# Patient Record
Sex: FEMALE | Race: WHITE | Employment: UNEMPLOYED | URBAN - METROPOLITAN AREA
[De-identification: names, ages, dates, MRNs, and addresses within clinical notes are randomized per-mention and may not be internally consistent; named-entity substitution may affect disease eponyms.]

---

## 2017-10-10 DIAGNOSIS — F33.0 MAJOR DEPRESSIVE DISORDER, RECURRENT EPISODE, MILD (H): ICD-10-CM

## 2017-10-10 RX ORDER — TRAZODONE HYDROCHLORIDE 50 MG/1
TABLET, FILM COATED ORAL
Qty: 30 TABLET | Refills: 0 | Status: SHIPPED | OUTPATIENT
Start: 2017-10-10 | End: 2017-11-04

## 2017-11-04 DIAGNOSIS — F33.0 MAJOR DEPRESSIVE DISORDER, RECURRENT EPISODE, MILD (H): ICD-10-CM

## 2017-11-07 RX ORDER — TRAZODONE HYDROCHLORIDE 50 MG/1
TABLET, FILM COATED ORAL
Qty: 30 TABLET | Refills: 0 | Status: SHIPPED | OUTPATIENT
Start: 2017-11-07 | End: 2017-11-15

## 2017-11-07 NOTE — TELEPHONE ENCOUNTER
traZODone (DESYREL) 50 MG tablet    Routing refill request to provider for review/approval because:  Sharyn given x1 and patient did not follow up, please advise

## 2017-11-09 DIAGNOSIS — F33.0 MAJOR DEPRESSIVE DISORDER, RECURRENT EPISODE, MILD (H): ICD-10-CM

## 2017-11-10 RX ORDER — TRAZODONE HYDROCHLORIDE 50 MG/1
TABLET, FILM COATED ORAL
Qty: 30 TABLET | Refills: 0 | OUTPATIENT
Start: 2017-11-10

## 2017-11-15 ENCOUNTER — OFFICE VISIT (OUTPATIENT)
Dept: INTERNAL MEDICINE | Facility: CLINIC | Age: 57
End: 2017-11-15
Payer: COMMERCIAL

## 2017-11-15 VITALS
HEART RATE: 78 BPM | TEMPERATURE: 97.6 F | DIASTOLIC BLOOD PRESSURE: 78 MMHG | RESPIRATION RATE: 18 BRPM | SYSTOLIC BLOOD PRESSURE: 136 MMHG | WEIGHT: 213 LBS | BODY MASS INDEX: 36 KG/M2

## 2017-11-15 DIAGNOSIS — E78.5 HYPERLIPIDEMIA LDL GOAL <160: ICD-10-CM

## 2017-11-15 DIAGNOSIS — Z11.59 ENCOUNTER FOR HEPATITIS C SCREENING TEST FOR LOW RISK PATIENT: ICD-10-CM

## 2017-11-15 DIAGNOSIS — Z00.00 ENCOUNTER FOR ROUTINE ADULT HEALTH EXAMINATION WITHOUT ABNORMAL FINDINGS: Primary | ICD-10-CM

## 2017-11-15 DIAGNOSIS — F33.0 MAJOR DEPRESSIVE DISORDER, RECURRENT EPISODE, MILD (H): ICD-10-CM

## 2017-11-15 DIAGNOSIS — F32.0 MILD MAJOR DEPRESSION (H): ICD-10-CM

## 2017-11-15 DIAGNOSIS — Z12.31 ENCOUNTER FOR SCREENING MAMMOGRAM FOR BREAST CANCER: ICD-10-CM

## 2017-11-15 DIAGNOSIS — E66.3 OVERWEIGHT: ICD-10-CM

## 2017-11-15 PROCEDURE — 99396 PREV VISIT EST AGE 40-64: CPT | Performed by: INTERNAL MEDICINE

## 2017-11-15 RX ORDER — CITALOPRAM HYDROBROMIDE 20 MG/1
20 TABLET ORAL DAILY
Qty: 90 TABLET | Refills: 3 | Status: SHIPPED | OUTPATIENT
Start: 2017-11-15 | End: 2018-08-01

## 2017-11-15 RX ORDER — VENLAFAXINE 75 MG/1
75 TABLET ORAL DAILY
Qty: 90 TABLET | Refills: 3 | Status: SHIPPED | OUTPATIENT
Start: 2017-11-15 | End: 2019-04-10

## 2017-11-15 RX ORDER — TRAZODONE HYDROCHLORIDE 50 MG/1
50 TABLET, FILM COATED ORAL AT BEDTIME
Qty: 90 TABLET | Refills: 3 | Status: SHIPPED | OUTPATIENT
Start: 2017-11-15 | End: 2018-12-27

## 2017-11-15 ASSESSMENT — ANXIETY QUESTIONNAIRES
IF YOU CHECKED OFF ANY PROBLEMS ON THIS QUESTIONNAIRE, HOW DIFFICULT HAVE THESE PROBLEMS MADE IT FOR YOU TO DO YOUR WORK, TAKE CARE OF THINGS AT HOME, OR GET ALONG WITH OTHER PEOPLE: NOT DIFFICULT AT ALL
5. BEING SO RESTLESS THAT IT IS HARD TO SIT STILL: NOT AT ALL
7. FEELING AFRAID AS IF SOMETHING AWFUL MIGHT HAPPEN: NOT AT ALL
1. FEELING NERVOUS, ANXIOUS, OR ON EDGE: NEARLY EVERY DAY
3. WORRYING TOO MUCH ABOUT DIFFERENT THINGS: MORE THAN HALF THE DAYS
GAD7 TOTAL SCORE: 11
2. NOT BEING ABLE TO STOP OR CONTROL WORRYING: MORE THAN HALF THE DAYS
6. BECOMING EASILY ANNOYED OR IRRITABLE: SEVERAL DAYS

## 2017-11-15 ASSESSMENT — PATIENT HEALTH QUESTIONNAIRE - PHQ9
5. POOR APPETITE OR OVEREATING: NEARLY EVERY DAY
SUM OF ALL RESPONSES TO PHQ QUESTIONS 1-9: 3

## 2017-11-15 NOTE — NURSING NOTE
"Chief Complaint   Patient presents with     Physical     no pap       Initial BP (!) 146/94  Pulse 78  Temp 97.6  F (36.4  C) (Oral)  Resp 18  Wt 213 lb (96.6 kg)  BMI 36 kg/m2 Estimated body mass index is 36 kg/(m^2) as calculated from the following:    Height as of 10/6/16: 5' 4.5\" (1.638 m).    Weight as of this encounter: 213 lb (96.6 kg).  Blood pressure completed using cuff size: large    "

## 2017-11-15 NOTE — PROGRESS NOTES
Answers for HPI/ROS submitted by the patient on 11/12/2017   Annual Exam:  Getting at least 3 servings of Calcium per day:: Yes  Bi-annual eye exam:: Yes  Dental care twice a year:: Yes  Sleep apnea or symptoms of sleep apnea:: None  Frequency of exercise:: 4-5 days/week  Taking medications regularly:: Yes  Medication side effects:: None  Additional concerns today:: YES  PHQ-2 Score: 0  Duration of exercise:: 30-45 minutes     SUBJECTIVE:   CC: Katie Garcia is an 57 year old woman who presents for preventive health visit.           Today's PHQ-2 Score:   PHQ-2 ( 1999 [employer]) 11/15/2017 11/12/2017   Q1: Little interest or pleasure in doing things 0 0   Q2: Feeling down, depressed or hopeless - 0   PHQ-2 Score - 0   Q1: Little interest or pleasure in doing things - Not at all   Q2: Feeling down, depressed or hopeless - Not at all   PHQ-2 Score - 0       Abuse: Current or Past(Physical, Sexual or Emotional)- No  Do you feel safe in your environment - Yes    Social History   Substance Use Topics     Smoking status: Never Smoker     Smokeless tobacco: Never Used     Alcohol use No     none    Reviewed orders with patient.  Reviewed health maintenance and updated orders accordingly - Yes      Patient over age 50, mutual decision to screen reflected in health maintenance.      Pertinent mammograms are reviewed under the imaging tab.  History of abnormal Pap smear: NO - age 30-65 PAP every 5 years with negative HPV co-testing recommended    Reviewed and updated as needed this visit by clinical staffTobacco  Allergies  Meds  Problems  Med Hx  Surg Hx  Fam Hx  Soc Hx          Reviewed and updated as needed this visit by Provider  Allergies  Meds  Problems          Patient continues on both citalopram and venlafaxine for major depression.  Symptoms are well controlled, today's PHQ-9 noted.  Needs refills of medicines today, also takes trazodone to assist with sleep.    Has had difficulty losing weight.  She is  quite active, exercises regularly, but admittedly is not necessarily careful with her diet.  Likes to eat, feels she is an emotional eater.  Would be interested in nutrition referral if appropriate.    ROS:  C: NEGATIVE for fever, chills, change in weight  I: NEGATIVE for worrisome rashes, moles or lesions  E: NEGATIVE for vision changes or irritation  ENT: NEGATIVE for ear, mouth and throat problems  R: NEGATIVE for significant cough or SOB  B: NEGATIVE for masses, tenderness or discharge  CV: NEGATIVE for chest pain, palpitations or peripheral edema  GI: NEGATIVE for nausea, abdominal pain, heartburn, or change in bowel habits  : NEGATIVE for unusual urinary or vaginal symptoms. No vaginal bleeding.  M: NEGATIVE for significant arthralgias or myalgia  N: NEGATIVE for weakness, dizziness or paresthesias  P: NEGATIVE for changes in mood or affect     OBJECTIVE:   /78  Pulse 78  Temp 97.6  F (36.4  C) (Oral)  Resp 18  Wt 213 lb (96.6 kg)  BMI 36 kg/m2  EXAM:  GENERAL: healthy, alert and no distress  NECK: no adenopathy, no asymmetry, masses, or scars and thyroid normal to palpation  RESP: lungs clear to auscultation - no rales, rhonchi or wheezes  CV: regular rate and rhythm, normal S1 S2, no S3 or S4, no murmur, click or rub, no peripheral edema and peripheral pulses strong  ABDOMEN: soft, nontender, no hepatosplenomegaly, no masses and bowel sounds normal  MS: no gross musculoskeletal defects noted, no edema    ASSESSMENT/PLAN:   1. Encounter for routine adult health examination without abnormal findings  Discussed self breast exam, schedule for mammogram.  Discussed importance of regular pelvic exams and PAP smears to check for GYN malignancies.  Discussed cardiac risk factor modification including screening for (and treating if present) cholesterol, HTN, DM, and avoiding smoking.  Recommended a low fat diet and regular aerobic activity.     2. Major depressive disorder, recurrent episode, mild  "(H)  Well controlled, continue current medications.  - traZODone (DESYREL) 50 MG tablet; Take 1 tablet (50 mg) by mouth At Bedtime  Dispense: 90 tablet; Refill: 3  - citalopram (CELEXA) 20 MG tablet; Take 1 tablet (20 mg) by mouth daily  Dispense: 90 tablet; Refill: 3  - venlafaxine (EFFEXOR) 75 MG tablet; Take 1 tablet (75 mg) by mouth daily  Dispense: 90 tablet; Refill: 3  - DEPRESSION ACTION PLAN (DAP)    3. Encounter for hepatitis C screening test for low risk patient  Return for fasting surveillance labs in the next few weeks.  - Hepatitis C Screen Reflex to HCV RNA Quant and Genotype; Future    4. Mild major depression (H)      5. Hyperlipidemia LDL goal <160    - Lipid panel reflex to direct LDL Fasting; Future  - Comprehensive metabolic panel; Future    6. Encounter for screening mammogram for breast cancer    - *MA Screening Digital Bilateral; Future    7. Overweight  Nutrition referral, as ordered.  - NUTRITION REFERRAL    COUNSELING:   Reviewed preventive health counseling, as reflected in patient instructions         reports that she has never smoked. She has never used smokeless tobacco.    Estimated body mass index is 36 kg/(m^2) as calculated from the following:    Height as of 10/6/16: 5' 4.5\" (1.638 m).    Weight as of this encounter: 213 lb (96.6 kg).   Weight management plan nutritionist consultation    Counseling Resources:  ATP IV Guidelines  Pooled Cohorts Equation Calculator  Breast Cancer Risk Calculator  FRAX Risk Assessment  ICSI Preventive Guidelines  Dietary Guidelines for Americans, 2010  USDA's MyPlate  ASA Prophylaxis  Lung CA Screening    Enoc Huizar MD  [facility]  "

## 2017-11-15 NOTE — PATIENT INSTRUCTIONS
- Please come in for fasting labs in the next few weeks, at your convenience.  I will be in touch with you when I receive the results.    - Mammogram can be scheduled at our , or by calling our appointment line: [phone]

## 2017-11-15 NOTE — MR AVS SNAPSHOT
After Visit Summary   11/15/2017    Katie Garcia    MRN: [id number]           Patient Information     Date Of Birth          1960        Visit Information        Provider Department      11/15/2017 11:15 AM Enoc Huizar MD [facility]        Today's Diagnoses     Encounter for hepatitis C screening test for low risk patient    -  1    Major depressive disorder, recurrent episode, mild (H)        Mild major depression (H)        Hyperlipidemia LDL goal <160        Encounter for screening mammogram for breast cancer        Overweight          Care Instructions    - Please come in for fasting labs in the next few weeks, at your convenience.  I will be in touch with you when I receive the results.    - Mammogram can be scheduled at our , or by calling our appointment line: [phone]           Follow-ups after your visit        Additional Services     NUTRITION REFERRAL       Your provider has referred you to: [facility]: [facility] [phone]   [email or web address]    Please be aware that coverage of these services is subject to the terms and limitations of your health insurance plan.  Call member services at your health plan with any benefit or coverage questions.      Please bring the following with you to your appointment:    (1) This referral request  (2) Any documents given to you regarding this referral  (3) Any specific questions you have about diet and/or food choices                  Future tests that were ordered for you today     Open Future Orders        Priority Expected Expires Ordered    *MA Screening Digital Bilateral Routine  11/15/2018 11/15/2017    Lipid panel reflex to direct LDL Fasting Routine  2/13/2018 11/15/2017    Comprehensive metabolic panel Routine  2/13/2018 11/15/2017    Hepatitis C Screen Reflex to HCV RNA Quant and Genotype Routine  2/13/2018 11/15/2017             Who to contact     If you have questions or need follow up information about today's clinic visit or your schedule please contact [facility] directly at [phone].  Normal or non-critical lab and imaging results will be communicated to you by Leverhart, letter or phone within 4 business days after the clinic has received the results. If you do not hear from us within 7 days, please contact the clinic through Leverhart or phone. If you have a critical or abnormal lab result, we will notify you by phone as soon as possible.  Submit refill requests through [employer] or call your pharmacy and they will forward the refill request to us. Please allow 3 business days for your refill to be completed.          Additional Information About Your Visit        LeverharRetailTower Information     KartoonArt gives you secure access to your electronic health record. If you see a primary care provider, you can also send messages to your care team and make appointments. If you have questions, please call your primary care clinic.  If you do not have a primary care provider, please call [phone] and they will assist you.        Care EveryWhere ID     This is your Care EveryWhere ID. This could be used by other organizations to access your [facility] medical records  [id number]        Your Vitals Were     Pulse Temperature Respirations BMI (Body Mass Index)          78 97.6  F (36.4  C) (Oral) 18 36 kg/m2         Blood Pressure from Last 3 Encounters:   11/15/17 (!) 146/94   10/06/16 126/80   09/03/15 122/72    Weight from Last 3 Encounters:   11/15/17 213 lb (96.6 kg)   10/06/16 218 lb 9 oz (99.1 kg)   09/03/15 216 lb (98 kg)              We Performed the Following     DEPRESSION ACTION PLAN (DAP)     NUTRITION REFERRAL          Today's Medication Changes          These changes are accurate as of: 11/15/17 12:14 PM.  If you have any questions, ask your nurse or doctor.               These medicines have changed or  have updated prescriptions.        Dose/Directions    traZODone 50 MG tablet   Commonly known as:  DESYREL   This may have changed:  See the new instructions.   Used for:  Major depressive disorder, recurrent episode, mild (H)   Changed by:  Enoc Huizar MD        Dose:  50 mg   Take 1 tablet (50 mg) by mouth At Bedtime   Quantity:  90 tablet   Refills:  3            Where to get your medicines      These medications were sent to [email or web address] [address] [place] [address] [address] AT [place] [address] [address], [facility] [address]    Hours:  24-hours Phone:  [phone]     citalopram 20 MG tablet    traZODone 50 MG tablet    venlafaxine 75 MG tablet                Primary Care Provider Office Phone # Fax #    Enoc Huizar [phone]       [address] [place] [address]        Equal Access to Services     [facility]IE [facility] Center[facility] AH: Hadii aad ku hadasho Soomaali, waaxda luqadaha, qaybta kaalmada adeegyada, waxay idiin hayaan adeeg kharasusy juárez . So [facility] [phone].    ATENCIÓN: Si habla español, tiene a broussard disposición servicios gratuitos de asistencia lingüística. LlPaulding [facility] Hospital [phone].    We comply with applicable federal civil rights laws and [place] laws. We do not discriminate on the basis of race, color, national origin, age, disability, sex, sexual orientation, or gender identity.            Thank you!     Thank you for choosing [facility]  for your care. Our goal is always to provide you with excellent care. Hearing back from our patients is one way we can continue to improve our services. Please take a few minutes to complete the written survey that you may receive in the mail after your visit with us. Thank you!             Your Updated Medication List - Protect others around you: Learn how to safely use, store and throw away your medicines at www.disposemymeds.org.          This list is accurate as of: 11/15/17  12:14 PM.  Always use your most recent med list.                   Brand Name Dispense Instructions for use Diagnosis    citalopram 20 MG tablet    celeXA    90 tablet    Take 1 tablet (20 mg) by mouth daily    Major depressive disorder, recurrent episode, mild (H)       traZODone 50 MG tablet    DESYREL    90 tablet    Take 1 tablet (50 mg) by mouth At Bedtime    Major depressive disorder, recurrent episode, mild (H)       venlafaxine 75 MG tablet    EFFEXOR    90 tablet    Take 1 tablet (75 mg) by mouth daily    Major depressive disorder, recurrent episode, mild (H)

## 2017-11-16 ASSESSMENT — ANXIETY QUESTIONNAIRES: GAD7 TOTAL SCORE: 11

## 2017-11-17 DIAGNOSIS — Z11.59 ENCOUNTER FOR HEPATITIS C SCREENING TEST FOR LOW RISK PATIENT: ICD-10-CM

## 2017-11-17 DIAGNOSIS — E78.5 HYPERLIPIDEMIA LDL GOAL <160: ICD-10-CM

## 2017-11-17 LAB
ALBUMIN SERPL-MCNC: 3.7 G/DL (ref 3.4–5)
ALP SERPL-CCNC: 84 U/L (ref 40–150)
ALT SERPL W P-5'-P-CCNC: 24 U/L (ref 0–50)
ANION GAP SERPL CALCULATED.3IONS-SCNC: 5 MMOL/L (ref 3–14)
AST SERPL W P-5'-P-CCNC: 14 U/L (ref 0–45)
BILIRUB SERPL-MCNC: 0.5 MG/DL (ref 0.2–1.3)
BUN SERPL-MCNC: 16 MG/DL (ref 7–30)
CALCIUM SERPL-MCNC: 8.8 MG/DL (ref 8.5–10.1)
CHLORIDE SERPL-SCNC: 99 MMOL/L (ref 94–109)
CHOLEST SERPL-MCNC: 264 MG/DL
CO2 SERPL-SCNC: 32 MMOL/L (ref 20–32)
CREAT SERPL-MCNC: 0.97 MG/DL (ref 0.52–1.04)
GFR SERPL CREATININE-BSD FRML MDRD: 59 ML/MIN/1.7M2
GLUCOSE SERPL-MCNC: 87 MG/DL (ref 70–99)
HCV AB SERPL QL IA: NONREACTIVE
HDLC SERPL-MCNC: 69 MG/DL
LDLC SERPL CALC-MCNC: 170 MG/DL
NONHDLC SERPL-MCNC: 195 MG/DL
POTASSIUM SERPL-SCNC: 4 MMOL/L (ref 3.4–5.3)
PROT SERPL-MCNC: 7.4 G/DL (ref 6.8–8.8)
SODIUM SERPL-SCNC: 136 MMOL/L (ref 133–144)
TRIGL SERPL-MCNC: 127 MG/DL

## 2017-11-17 PROCEDURE — 80061 LIPID PANEL: CPT | Performed by: INTERNAL MEDICINE

## 2017-11-17 PROCEDURE — 80053 COMPREHEN METABOLIC PANEL: CPT | Performed by: INTERNAL MEDICINE

## 2017-11-17 PROCEDURE — 86803 HEPATITIS C AB TEST: CPT | Performed by: INTERNAL MEDICINE

## 2017-11-17 PROCEDURE — 36415 COLL VENOUS BLD VENIPUNCTURE: CPT | Performed by: INTERNAL MEDICINE

## 2017-12-11 DIAGNOSIS — F33.0 MAJOR DEPRESSIVE DISORDER, RECURRENT EPISODE, MILD (H): ICD-10-CM

## 2017-12-12 RX ORDER — VENLAFAXINE 75 MG/1
TABLET ORAL
Qty: 180 TABLET | Refills: 0 | Status: SHIPPED | OUTPATIENT
Start: 2017-12-12 | End: 2018-08-01

## 2017-12-12 NOTE — TELEPHONE ENCOUNTER
Dosing sent to pharmacy for 1 tablet daily at last office visit.  Medication request is for BID dosing.  Was dose changed?

## 2018-06-07 DIAGNOSIS — F33.0 MAJOR DEPRESSIVE DISORDER, RECURRENT EPISODE, MILD (H): ICD-10-CM

## 2018-06-07 RX ORDER — VENLAFAXINE 75 MG/1
TABLET ORAL
Qty: 60 TABLET | Refills: 0 | Status: SHIPPED | OUTPATIENT
Start: 2018-06-07 | End: 2018-08-01

## 2018-06-07 NOTE — TELEPHONE ENCOUNTER
"Requested Prescriptions   Pending Prescriptions Disp Refills     venlafaxine (EFFEXOR) 75 MG tablet [Pharmacy Med Name: VENLAFAXINE 75MG TABLETS] 180 tablet 0     Sig: TAKE 1 TABLET BY MOUTH TWICE DAILY    Serotonin-Norepinephrine Reuptake Inhibitors  Failed    6/7/2018 12:24 PM       Failed - PHQ-9 score of less than 5 in past 6 months    Please review last PHQ-9 score.          Failed - Recent (6 mo) or future (30 days) visit within the authorizing provider's specialty    Patient had office visit in the last 6 months or has a visit in the next 30 days with authorizing provider or within the authorizing provider's specialty.  See \"Patient Info\" tab in inbasket, or \"Choose Columns\" in Meds & Orders section of the refill encounter.           Passed - Blood pressure under 140/90 in past 12 months    BP Readings from Last 3 Encounters:   11/15/17 136/78   10/06/16 126/80   09/03/15 122/72                Passed - Patient is age 18 or older       Passed - No active pregnancy on record       Passed - Normal serum creatinine on file in past 12 months    Recent Labs   Lab Test  11/17/17   1004   CR  0.97            Passed - No positive pregnancy test in past 12 months        PHQ-9 SCORE 4/17/2015 9/3/2015 11/15/2017   Total Score 2 - -   Total Score - 2 3     Medication is being filled for 1 time refill only due to:  due for office visit    "

## 2018-08-01 ENCOUNTER — OFFICE VISIT (OUTPATIENT)
Dept: INTERNAL MEDICINE | Facility: CLINIC | Age: 58
End: 2018-08-01
Payer: COMMERCIAL

## 2018-08-01 VITALS
TEMPERATURE: 97.6 F | WEIGHT: 208 LBS | HEIGHT: 65 IN | SYSTOLIC BLOOD PRESSURE: 110 MMHG | HEART RATE: 80 BPM | RESPIRATION RATE: 16 BRPM | DIASTOLIC BLOOD PRESSURE: 72 MMHG | BODY MASS INDEX: 34.66 KG/M2

## 2018-08-01 DIAGNOSIS — Z12.31 VISIT FOR SCREENING MAMMOGRAM: ICD-10-CM

## 2018-08-01 DIAGNOSIS — E66.01 MORBID OBESITY (H): ICD-10-CM

## 2018-08-01 DIAGNOSIS — F32.0 MILD MAJOR DEPRESSION (H): Primary | ICD-10-CM

## 2018-08-01 PROCEDURE — 99214 OFFICE O/P EST MOD 30 MIN: CPT | Performed by: INTERNAL MEDICINE

## 2018-08-01 RX ORDER — VENLAFAXINE HYDROCHLORIDE 37.5 MG/1
CAPSULE, EXTENDED RELEASE ORAL
Qty: 26 CAPSULE | Refills: 0 | Status: SHIPPED | OUTPATIENT
Start: 2018-08-01 | End: 2019-04-10

## 2018-08-01 RX ORDER — CITALOPRAM HYDROBROMIDE 10 MG/1
TABLET ORAL
Qty: 26 TABLET | Refills: 0 | Status: SHIPPED | OUTPATIENT
Start: 2018-08-01 | End: 2019-04-10

## 2018-08-01 NOTE — MR AVS SNAPSHOT
After Visit Summary   8/1/2018    Katie Garcia    MRN: [id number]           Patient Information     Date Of Birth          1960        Visit Information        Provider Department      8/1/2018 1:30 PM Enoc Huizar MD [facility]        Today's Diagnoses     Mild major depression (H)    -  1    Visit for screening mammogram        Morbid obesity (H)           Follow-ups after your visit        Future tests that were ordered for you today     Open Future Orders        Priority Expected Expires Ordered    MA SCREENING DIGITAL BILAT - Future  (s+30) Routine  8/1/2019 8/1/2018            Who to contact     If you have questions or need follow up information about today's clinic visit or your schedule please contact [facility] directly at [phone].  Normal or non-critical lab and imaging results will be communicated to you by Playnomicshart, letter or phone within 4 business days after the clinic has received the results. If you do not hear from us within 7 days, please contact the clinic through Playnomicshart or phone. If you have a critical or abnormal lab result, we will notify you by phone as soon as possible.  Submit refill requests through [employer] or call your pharmacy and they will forward the refill request to us. Please allow 3 business days for your refill to be completed.          Additional Information About Your Visit        MyChart Information     [employer] gives you secure access to your electronic health record. If you see a primary care provider, you can also send messages to your care team and make appointments. If you have questions, please call your primary care clinic.  If you do not have a primary care provider, please call [phone] and they will assist you.        Care EveryWhere ID     This is your Care EveryWhere ID. This could be used by other organizations to access your [facility] medical records  [id number]       "  Your Vitals Were     Pulse Temperature Respirations Height BMI (Body Mass Index)       80 97.6  F (36.4  C) (Oral) 16 5' 4.5\" (1.638 m) 35.15 kg/m2        Blood Pressure from Last 3 Encounters:   08/01/18 110/72   11/15/17 136/78   10/06/16 126/80    Weight from Last 3 Encounters:   08/01/18 208 lb (94.3 kg)   11/15/17 213 lb (96.6 kg)   10/06/16 218 lb 9 oz (99.1 kg)                 Today's Medication Changes          These changes are accurate as of 8/1/18  2:02 PM.  If you have any questions, ask your nurse or doctor.               These medicines have changed or have updated prescriptions.        Dose/Directions    citalopram 10 MG tablet   Commonly known as:  celeXA   This may have changed:    - medication strength  - how much to take  - how to take this  - when to take this  - additional instructions   Used for:  Mild major depression (H)   Changed by:  Enoc Huizar MD        1 tablet daily x 2 weeks, then 1 tablet every other day x 2 weeks, then 1 tablet every 3rd day x 2 weeks, then stop completely   Quantity:  26 tablet   Refills:  0       * venlafaxine 75 MG tablet   Commonly known as:  EFFEXOR   This may have changed:  Another medication with the same name was added. Make sure you understand how and when to take each.   Used for:  Major depressive disorder, recurrent episode, mild (H)   Changed by:  Enoc Huizar MD        Dose:  75 mg   Take 1 tablet (75 mg) by mouth daily   Quantity:  90 tablet   Refills:  3       * venlafaxine 37.5 MG 24 hr capsule   Commonly known as:  EFFEXOR-XR   This may have changed:  You were already taking a medication with the same name, and this prescription was added. Make sure you understand how and when to take each.   Used for:  Mild major depression (H)   Changed by:  Enoc Huizar MD        Take 1 tab daily x 2 weeks, then 1 tab every other day x 2 weeks, then 1 tab every 3rd day x 2 weeks, then stop.  Begin this 2 weeks after tapering off " citalopram.   Quantity:  26 capsule   Refills:  0       * Notice:  This list has 2 medication(s) that are the same as other medications prescribed for you. Read the directions carefully, and ask your doctor or other care provider to review them with you.         Where to get your medicines      These medications were sent to [facility] [id number] - [facility] [address] AT [facility] [address], [facility] [address]     Phone:  [phone]     citalopram 10 MG tablet         Some of these will need a paper prescription and others can be bought over the counter.  Ask your nurse if you have questions.     Bring a paper prescription for each of these medications     venlafaxine 37.5 MG 24 hr capsule                Primary Care Provider Office Phone # Fax #    Enoc Huizar -[phone]       [address] [place] [address]        Equal Access to Services     REJI CHRISTOPHER AH: Hadii aad ku hadasho Soomaali, waaxda luqadaha, qaybta kaalmada adeegyada, waxay idiin hayagustinn adiel juárez . So [facility] [phone].    ATENCIÓN: Si jerela español, tiene a broussard disposición servicios gratuitos de asistencia lingüística. Llame al [phone].    We comply with applicable federal civil rights laws and [place] laws. We do not discriminate on the basis of race, color, national origin, age, disability, sex, sexual orientation, or gender identity.            Thank you!     Thank you for choosing [facility]  for your care. Our goal is always to provide you with excellent care. Hearing back from our patients is one way we can continue to improve our services. Please take a few minutes to complete the written survey that you may receive in the mail after your visit with us. Thank you!             Your Updated Medication List - Protect others around you: Learn how to safely use, store and throw away your medicines at www.disposemymeds.org.           This list is accurate as of 8/1/18  2:02 PM.  Always use your most recent med list.                   Brand Name Dispense Instructions for use Diagnosis    citalopram 10 MG tablet    celeXA    26 tablet    1 tablet daily x 2 weeks, then 1 tablet every other day x 2 weeks, then 1 tablet every 3rd day x 2 weeks, then stop completely    Mild major depression (H)       traZODone 50 MG tablet    DESYREL    90 tablet    Take 1 tablet (50 mg) by mouth At Bedtime    Major depressive disorder, recurrent episode, mild (H)       * venlafaxine 75 MG tablet    EFFEXOR    90 tablet    Take 1 tablet (75 mg) by mouth daily    Major depressive disorder, recurrent episode, mild (H)       * venlafaxine 37.5 MG 24 hr capsule    EFFEXOR-XR    26 capsule    Take 1 tab daily x 2 weeks, then 1 tab every other day x 2 weeks, then 1 tab every 3rd day x 2 weeks, then stop.  Begin this 2 weeks after tapering off citalopram.    Mild major depression (H)       * Notice:  This list has 2 medication(s) that are the same as other medications prescribed for you. Read the directions carefully, and ask your doctor or other care provider to review them with you.

## 2018-08-01 NOTE — PROGRESS NOTES
"  SUBJECTIVE:   Katie Garcia is a 58 year old female who presents to clinic today for the following health issues:      Depression Followup    Status since last visit: Stable     See PHQ-9 for current symptoms.  Other associated symptoms: None    Complicating factors:   Significant life event:  No   Current substance abuse:  None  Anxiety or Panic symptoms:  No    PHQ-9 9/3/2015 11/15/2017   Total Score 2 3   Q9: Suicide Ideation Not at all Not at all       PHQ-9  English  PHQ-9   Any Language  Suicide Assessment Five-step Evaluation and Treatment (SAFE-T)      Amount of exercise or physical activity: 4-5 days/week for an average of 30-60 minutes    Problems taking medications regularly: No    Medication side effects: none    Diet: regular (no restrictions)            Problem list and histories reviewed & adjusted, as indicated.  Additional history:     Patient has been on current doses of antidepressant medications for a number of years now, would like to try coming off of them completely.  At present she is on citalopram 20 mg daily, Effexor XR 75 mg daily.  Today's PHQ 9 noted.    Due for mammogram and a number of other health maintenance tasks, she agrees to proceed with mammogram scheduling.    Reviewed and updated as needed this visit by clinical staff       Reviewed and updated as needed this visit by Provider         ROS:  Constitutional, HEENT, cardiovascular, pulmonary, GI, , musculoskeletal, neuro, skin, endocrine and psych systems are negative, except as otherwise noted.    OBJECTIVE:     /72  Pulse 80  Temp 97.6  F (36.4  C) (Oral)  Resp 16  Ht 5' 4.5\" (1.638 m)  Wt 208 lb (94.3 kg)  BMI 35.15 kg/m2  Body mass index is 35.15 kg/(m^2).  GENERAL: healthy, alert and no distress  NECK: no adenopathy, no asymmetry, masses, or scars and thyroid normal to palpation  RESP: lungs clear to auscultation - no rales, rhonchi or wheezes  CV: regular rate and rhythm, normal S1 S2, no S3 or S4, no " murmur, click or rub, no peripheral edema and peripheral pulses strong  ABDOMEN: soft, nontender, no hepatosplenomegaly, no masses and bowel sounds normal  MS: no gross musculoskeletal defects noted, no edema        ASSESSMENT/PLAN:     1. Mild major depression (H)  We will taper off both medications slowly, one at a time.  Will start with tapering citalopram over the next 6 weeks, as designated by new prescription.  During that time she will continue her venlafaxine as currently.  After citalopram stopped completely, continue venlafaxine monotherapy at current dose for 2 more weeks, then start venlafaxine taper as designated below.  - citalopram (CELEXA) 10 MG tablet; 1 tablet daily x 2 weeks, then 1 tablet every other day x 2 weeks, then 1 tablet every 3rd day x 2 weeks, then stop completely  Dispense: 26 tablet; Refill: 0  - venlafaxine (EFFEXOR-XR) 37.5 MG 24 hr capsule; Take 1 tab daily x 2 weeks, then 1 tab every other day x 2 weeks, then 1 tab every 3rd day x 2 weeks, then stop.  Begin this 2 weeks after tapering off citalopram.  Dispense: 26 capsule; Refill: 0    2. Visit for screening mammogram    - MA SCREENING DIGITAL BILAT - Future  (s+30); Future    3. Morbid obesity (H)  Reiterated importance of healthy diet, exercise, etc.          Enoc Huizar MD  [facility]

## 2018-08-02 ASSESSMENT — PATIENT HEALTH QUESTIONNAIRE - PHQ9: SUM OF ALL RESPONSES TO PHQ QUESTIONS 1-9: 1

## 2018-11-10 ENCOUNTER — HEALTH MAINTENANCE LETTER (OUTPATIENT)
Age: 58
End: 2018-11-10

## 2018-12-27 DIAGNOSIS — F33.0 MAJOR DEPRESSIVE DISORDER, RECURRENT EPISODE, MILD (H): ICD-10-CM

## 2018-12-28 NOTE — TELEPHONE ENCOUNTER
"Requested Prescriptions   Pending Prescriptions Disp Refills     traZODone (DESYREL) 50 MG tablet [Pharmacy Med Name: TRAZODONE 50MG TABLETS] 90 tablet 0    Last Written Prescription Date:  11/15/2017  Last Fill Quantity: 90,  # refills: 3   Last office visit: 8/1/2018 with prescribing provider:  8/1/2018   Future Office Visit:     Sig: TAKE 1 TABLET BY MOUTH AT BEDTIME    Serotonin Modulators Passed - 12/27/2018  3:43 PM       Passed - Recent (12 mo) or future (30 days) visit within the authorizing provider's specialty    Patient had office visit in the last 12 months or has a visit in the next 30 days with authorizing provider or within the authorizing provider's specialty.  See \"Patient Info\" tab in inbasket, or \"Choose Columns\" in Meds & Orders section of the refill encounter.             Passed - Patient is age 18 or older       Passed - No active pregnancy on record       Passed - No positive pregnancy test in past 12 months          "

## 2019-01-03 RX ORDER — TRAZODONE HYDROCHLORIDE 50 MG/1
TABLET, FILM COATED ORAL
Qty: 90 TABLET | Refills: 0 | Status: SHIPPED | OUTPATIENT
Start: 2019-01-03 | End: 2019-01-08

## 2019-01-08 DIAGNOSIS — F33.0 MAJOR DEPRESSIVE DISORDER, RECURRENT EPISODE, MILD (H): ICD-10-CM

## 2019-01-08 RX ORDER — TRAZODONE HYDROCHLORIDE 50 MG/1
50 TABLET, FILM COATED ORAL AT BEDTIME
Qty: 90 TABLET | Refills: 1 | Status: SHIPPED | OUTPATIENT
Start: 2019-01-08 | End: 2019-07-05

## 2019-01-08 NOTE — TELEPHONE ENCOUNTER
"Requested Prescriptions   Pending Prescriptions Disp Refills     traZODone (DESYREL) 50 MG tablet 90 tablet 0     Sig: Take 1 tablet (50 mg) by mouth At Bedtime    Serotonin Modulators Passed - 1/8/2019 10:37 AM       Passed - Recent (12 mo) or future (30 days) visit within the authorizing provider's specialty    Patient had office visit in the last 12 months or has a visit in the next 30 days with authorizing provider or within the authorizing provider's specialty.  See \"Patient Info\" tab in inbasket, or \"Choose Columns\" in Meds & Orders section of the refill encounter.             Passed - Medication is active on med list       Passed - Patient is age 18 or older       Passed - No active pregnancy on record       Passed - No positive pregnancy test in past 12 months          "

## 2019-01-08 NOTE — TELEPHONE ENCOUNTER
Medication has already been approved but patients insurance changed and she needs refill sent to a different pharmacy.

## 2019-01-22 ENCOUNTER — TELEPHONE (OUTPATIENT)
Dept: INTERNAL MEDICINE | Facility: CLINIC | Age: 59
End: 2019-01-22

## 2019-01-22 NOTE — TELEPHONE ENCOUNTER
Panel Management Review  [facility]    Patient Active Problem List   Diagnosis     Mild major depression (H)     Hyperlipidemia     HYPERLIPIDEMIA LDL GOAL <160     Other disorder of menstruation and other abnormal bleeding from female genital tract     Morbid obesity (H)         Patient is due/failing the following:   PREVENTION AND SCREENING       PAP       MAMMOGRAM  CARDIOVASCULAR and RENAL  DIABETES  DEPRESSION  ASTHMA  MIGRAINE  COPD  HEART FAILURE  CKD    Action needed:   Please call to schedule a Mammogram and Pap.     Type of outreach:    Sent letter.    Rosa Cedillo

## 2019-01-22 NOTE — LETTER
[facility]  [address] [place] [address]  [phone]  January 22, 2019    Katie Garcia  [address]  [place] [place] [address]    Dear Katie,    We care about your health and based on a review of your medical records, recommend the the following, to better manage your health:      You are in particular need of attention regarding:  -Breast Cancer Screening  -Cervical Cancer Screening    I am recommending that you:     -schedule a MAMMOGRAM which is due.    1 in 8 women will develop invasive breast cancer during her lifetime and it is the most common non-skin cancer in American women.  EARLY detection, new treatments, and a better understanding of the disease have increased survival rates - the 5 year survival rate in the 1960s was 63% and today it is close to 90%.    If you are under/uninsured, we recommend you contact the [facility]. They offer mammograms at no charge or on a sliding fee charge. You can schedule with them at [phone]. Please have them send us the results.      Please disregard this reminder if you have had this exam elsewhere within the last year.  It would be helpful for us to have a copy of your mammogram report in your file so that we can best coordinate your care - please contact us with when your test was done so we can update your record.             -schedule a PAP SMEAR EXAM which is due.  Please disregard this reminder if you have had this exam elsewhere within the last year.  It would be helpful for us to have a copy of your recent pap smear report in our file so that we can best coordinate your care.    If you are under/uninsured, we recommend you contact the [facility]. They offer pap smears at no charge or on a sliding fee charge. You can schedule with them at [phone]. Please have them send us the results.      Here is a list of Health Maintenance topics that are due now or due  soon:  Health Maintenance Due   Topic Date Due     HIV SCREEN (SYSTEM ASSIGNED)  05/11/1978     Zoster (Chicken Pox) Vaccine (1 of 2) 05/11/2010     Discuss Advance Directive Planning  05/11/2015     Mammogram - every 2 years  09/09/2017     Diptheria Tetanus Pertussis (DTAP/TDAP/TD) Vaccine (2 - Td) 03/05/2018     Flu Vaccine (1) 09/01/2018     Pap Smear - every 3 years  09/03/2018     Depression Assessment - every 6 months  02/01/2019       Please call us at [phone] or [phone] (or use Anaplan) to address the above recommendations.     Thank you for trusting [facility].  We appreciate the opportunity to serve you and look forward to supporting your healthcare needs in the future.    If you have (or plan to have) any of these tests done at a facility other than a [facility] or a [facility], please have the results from these tests sent to your primary physician at [facility].    Healthy Regards,    Woodrow Huizar MD/[facility]

## 2019-02-05 ENCOUNTER — TELEPHONE (OUTPATIENT)
Dept: INTERNAL MEDICINE | Facility: CLINIC | Age: 59
End: 2019-02-05

## 2019-02-05 NOTE — TELEPHONE ENCOUNTER
Panel Management Review  [facility]    Patient Active Problem List   Diagnosis     Mild major depression (H)     Hyperlipidemia     HYPERLIPIDEMIA LDL GOAL <160     Other disorder of menstruation and other abnormal bleeding from female genital tract     Morbid obesity (H)         Patient is due/failing the following:   PREVENTION AND SCREENING       PAP  CARDIOVASCULAR and RENAL  DIABETES  DEPRESSION  ASTHMA  MIGRAINE  COPD  HEART FAILURE  CKD    Action needed:   Patient needs office visit with MD (no labs needed)    Type of outreach:    Sent letter.    Rosa Cedillo

## 2019-02-05 NOTE — LETTER
[facility]  [address], [place] [address] [phone]  February 5, 2019    Katie Garcia  [address]  [facility] [address]    Dear Katie,    We care about your health and based on a review of your medical records, recommend the the following, to better manage your health:      You are in particular need of attention regarding:  -Cervical Cancer Screening    I am recommending that you:     -schedule a PAP SMEAR EXAM which is due.  Please disregard this reminder if you have had this exam elsewhere within the last year.  It would be helpful for us to have a copy of your recent pap smear report in our file so that we can best coordinate your care.    If you are under/uninsured, we recommend you contact the [facility]. They offer pap smears at no charge or on a sliding fee charge. You can schedule with them at [phone]. Please have them send us the results.      Here is a list of Health Maintenance topics that are due now or due soon:  Health Maintenance Due   Topic Date Due     HIV SCREEN (SYSTEM ASSIGNED)  05/11/1978     Zoster (Shingles) Vaccine (1 of 2) 05/11/2010     Discuss Advance Directive Planning  05/11/2015     Mammogram - every 2 years  09/09/2017     Diptheria Tetanus Pertussis (DTAP/TDAP/TD) Vaccine (2 - Td) 03/05/2018     Flu Vaccine (1) 09/01/2018     Pap Smear - every 3 years  09/03/2018     Depression Assessment - every 6 months  02/01/2019       Please call us at [phone] or [phone] (or use Pivot) to address the above recommendations.     Thank you for trusting [facility].  We appreciate the opportunity to serve you and look forward to supporting your healthcare needs in the future.    If you have (or plan to have) any of these tests done at a facility other than a [facility] or a [facility], please have the results from these tests sent to your primary physician at [facility].    Healthy Regards,    Woodrow Huizar MD/[facility]illo, CMA

## 2019-03-13 ENCOUNTER — ANCILLARY PROCEDURE (OUTPATIENT)
Dept: MAMMOGRAPHY | Facility: CLINIC | Age: 59
End: 2019-03-13
Attending: INTERNAL MEDICINE
Payer: COMMERCIAL

## 2019-03-13 ENCOUNTER — OFFICE VISIT (OUTPATIENT)
Dept: INTERNAL MEDICINE | Facility: CLINIC | Age: 59
End: 2019-03-13
Payer: COMMERCIAL

## 2019-03-13 ENCOUNTER — TELEPHONE (OUTPATIENT)
Dept: INTERNAL MEDICINE | Facility: CLINIC | Age: 59
End: 2019-03-13

## 2019-03-13 VITALS
RESPIRATION RATE: 16 BRPM | BODY MASS INDEX: 34.29 KG/M2 | SYSTOLIC BLOOD PRESSURE: 140 MMHG | HEIGHT: 65 IN | WEIGHT: 205.8 LBS | OXYGEN SATURATION: 98 % | DIASTOLIC BLOOD PRESSURE: 80 MMHG | HEART RATE: 70 BPM | TEMPERATURE: 97.9 F

## 2019-03-13 DIAGNOSIS — Z12.11 SPECIAL SCREENING FOR MALIGNANT NEOPLASMS, COLON: ICD-10-CM

## 2019-03-13 DIAGNOSIS — I10 ESSENTIAL HYPERTENSION: Primary | ICD-10-CM

## 2019-03-13 DIAGNOSIS — Z23 NEED FOR DIPHTHERIA-TETANUS-PERTUSSIS (TDAP) VACCINE: ICD-10-CM

## 2019-03-13 DIAGNOSIS — Z12.31 VISIT FOR SCREENING MAMMOGRAM: ICD-10-CM

## 2019-03-13 PROCEDURE — 90471 IMMUNIZATION ADMIN: CPT | Performed by: INTERNAL MEDICINE

## 2019-03-13 PROCEDURE — 77067 SCR MAMMO BI INCL CAD: CPT | Mod: TC

## 2019-03-13 PROCEDURE — 99214 OFFICE O/P EST MOD 30 MIN: CPT | Mod: 25 | Performed by: INTERNAL MEDICINE

## 2019-03-13 PROCEDURE — 90715 TDAP VACCINE 7 YRS/> IM: CPT | Performed by: INTERNAL MEDICINE

## 2019-03-13 RX ORDER — LISINOPRIL 10 MG/1
10 TABLET ORAL DAILY
Qty: 30 TABLET | Refills: 3 | Status: SHIPPED | OUTPATIENT
Start: 2019-03-13 | End: 2019-04-10

## 2019-03-13 ASSESSMENT — MIFFLIN-ST. JEOR: SCORE: 1506.44

## 2019-03-13 NOTE — PROGRESS NOTES
"  SUBJECTIVE:   Katie Garcia is a 58 year old female who presents to clinic today for the following health issues:      Medication Followup of Trazodone    Taking Medication as prescribed: yes    Side Effects:  None    Medication Helping Symptoms:  NO           Problem list and histories reviewed & adjusted, as indicated.  Additional history:     Has had elevated blood pressures in recent weeks to as high as 170s while at the dentist recently.  No previous personal treatment for high blood pressure, does have family history of hypertension.    Reviewed and updated as needed this visit by clinical staff  Tobacco  Allergies  Meds  Problems  Med Hx  Surg Hx  Fam Hx       Reviewed and updated as needed this visit by Provider  Tobacco  Allergies  Meds  Problems  Med Hx  Surg Hx  Fam Hx         ROS:  Constitutional, HEENT, cardiovascular, pulmonary, GI, , musculoskeletal, neuro, skin, endocrine and psych systems are negative, except as otherwise noted.    OBJECTIVE:     /80 (BP Location: Left arm, Patient Position: Chair, Cuff Size: Adult Large)   Pulse 70   Temp 97.9  F (36.6  C) (Oral)   Resp 16   Ht 1.638 m (5' 4.5\")   Wt 93.4 kg (205 lb 12.8 oz)   SpO2 98%   BMI 34.78 kg/m    Body mass index is 34.78 kg/m .  GENERAL: healthy, alert and no distress  NECK: no adenopathy, no asymmetry, masses, or scars and thyroid normal to palpation  RESP: lungs clear to auscultation - no rales, rhonchi or wheezes  CV: regular rate and rhythm, normal S1 S2, no S3 or S4, no murmur, click or rub, no peripheral edema and peripheral pulses strong  ABDOMEN: soft, nontender, no hepatosplenomegaly, no masses and bowel sounds normal  MS: no gross musculoskeletal defects noted, no edema        ASSESSMENT/PLAN:     1. Essential hypertension  Discussed options, will start low-dose lisinopril.  Follow-up in 1 month with previsit surveillance labs.  - lisinopril (PRINIVIL/ZESTRIL) 10 MG tablet; Take 1 tablet (10 " mg) by mouth daily  Dispense: 30 tablet; Refill: 3  - Lipid panel reflex to direct LDL Fasting; Future  - Comprehensive metabolic panel; Future  - UA with Microscopic reflex to Culture; Future  - TSH with free T4 reflex; Future    2. Need for diphtheria-tetanus-pertussis (Tdap) vaccine    - VACCINE ADMINISTRATION, INITIAL    3. Special screening for malignant neoplasms, colon    - GASTROENTEROLOGY ADULT REF PROCEDURE ONLY Matias Chaudhry [phone] ([place] [facility])        Enoc Huizar MD  [facility]

## 2019-03-13 NOTE — NURSING NOTE
Screening Questionnaire for Adult Immunization    Are you sick today?   No   Do you have allergies to medications, food, a vaccine component or latex?   No   Have you ever had a serious reaction after receiving a vaccination?   No   Do you have a long-term health problem with heart disease, lung disease, asthma, kidney disease, metabolic disease (e.g. diabetes), anemia, or other blood disorder?   No   Do you have cancer, leukemia, HIV/AIDS, or any other immune system problem?   No   In the past 3 months, have you taken medications that affect  your immune system, such as prednisone, other steroids, or anticancer drugs; drugs for the treatment of rheumatoid arthritis, Crohn s disease, or psoriasis; or have you had radiation treatments?   No   Have you had a seizure, or a brain or other nervous system problem?   No   During the past year, have you received a transfusion of blood or blood     products, or been given immune (gamma) globulin or antiviral drug?   No   For women: Are you pregnant or is there a chance you could become        pregnant during the next month?   No   Have you received any vaccinations in the past 4 weeks?   No     Immunization questionnaire answers were all negative.        Per orders of Dr. Huizar, injection of TDAP Adacel given by Rosa Cedillo. Patient instructed to remain in clinic for 15 minutes afterwards, and to report any adverse reaction to me immediately.       Screening performed by Rosa Cedillo on 3/13/2019 at 4:27 PM.

## 2019-03-13 NOTE — TELEPHONE ENCOUNTER
Patient reports brief episode of left hand numbness tingling radiating to left arm. Sx occurred less than 2 minutes.     No SOB. No CP. No radiation of numbness to jaw. No vision changes. No dizziness or lightheadedness. No recent strenuous actitivy. Recalls holding her phone for long period of time.     Reports HR a little fast but feels she in panicky.    Not a current smoker. No HLD. Family hx of MI/Stroke.    Patient reports coming in later today for BP recheck. Was at dentist last week with elevated BP of 166/90.     Ok for scheduled OV this afternoon.     If sx reoccur and are more intense/severe followed by chest pain, rapid HR, diaphoresis, SOB, dizziness/light headedness to call 911 or be seen in ER, another person to drive.    Pt expressed understanding and acceptance of the plan.  Pt had no further questions at this time.  Advised can call back to clinic at any time with concerns.     Ashleigh VEGA RN, BSN, PHN

## 2019-03-13 NOTE — PATIENT INSTRUCTIONS
- Start taking lisinopril (blood pressure medication) once daily.  (Prescription has been sent to your pharmacy)    - Please follow-up with me in clinic in 1 month.  - Please come in for fasting labs, a few days prior to the appointment with me.

## 2019-04-03 DIAGNOSIS — I10 ESSENTIAL HYPERTENSION: ICD-10-CM

## 2019-04-03 LAB
ALBUMIN SERPL-MCNC: 3.3 G/DL (ref 3.4–5)
ALBUMIN UR-MCNC: ABNORMAL MG/DL
ALP SERPL-CCNC: 69 U/L (ref 40–150)
ALT SERPL W P-5'-P-CCNC: 18 U/L (ref 0–50)
ANION GAP SERPL CALCULATED.3IONS-SCNC: 5 MMOL/L (ref 3–14)
APPEARANCE UR: CLEAR
AST SERPL W P-5'-P-CCNC: 14 U/L (ref 0–45)
BACTERIA #/AREA URNS HPF: ABNORMAL /HPF
BILIRUB SERPL-MCNC: 0.5 MG/DL (ref 0.2–1.3)
BILIRUB UR QL STRIP: NEGATIVE
BUN SERPL-MCNC: 12 MG/DL (ref 7–30)
CALCIUM SERPL-MCNC: 8.8 MG/DL (ref 8.5–10.1)
CHLORIDE SERPL-SCNC: 103 MMOL/L (ref 94–109)
CHOLEST SERPL-MCNC: 281 MG/DL
CO2 SERPL-SCNC: 29 MMOL/L (ref 20–32)
COLOR UR AUTO: YELLOW
CREAT SERPL-MCNC: 0.74 MG/DL (ref 0.52–1.04)
GFR SERPL CREATININE-BSD FRML MDRD: 88 ML/MIN/{1.73_M2}
GLUCOSE SERPL-MCNC: 97 MG/DL (ref 70–99)
GLUCOSE UR STRIP-MCNC: NEGATIVE MG/DL
HDLC SERPL-MCNC: 64 MG/DL
HGB UR QL STRIP: ABNORMAL
KETONES UR STRIP-MCNC: NEGATIVE MG/DL
LDLC SERPL CALC-MCNC: 190 MG/DL
LEUKOCYTE ESTERASE UR QL STRIP: ABNORMAL
NITRATE UR QL: NEGATIVE
NON-SQ EPI CELLS #/AREA URNS LPF: ABNORMAL /LPF
NONHDLC SERPL-MCNC: 217 MG/DL
PH UR STRIP: 7 PH (ref 5–7)
POTASSIUM SERPL-SCNC: 3.8 MMOL/L (ref 3.4–5.3)
PROT SERPL-MCNC: 6.7 G/DL (ref 6.8–8.8)
RBC #/AREA URNS AUTO: ABNORMAL /HPF
SODIUM SERPL-SCNC: 137 MMOL/L (ref 133–144)
SOURCE: ABNORMAL
SP GR UR STRIP: 1.01 (ref 1–1.03)
TRIGL SERPL-MCNC: 134 MG/DL
TSH SERPL DL<=0.005 MIU/L-ACNC: 1.26 MU/L (ref 0.4–4)
UROBILINOGEN UR STRIP-ACNC: 0.2 EU/DL (ref 0.2–1)
WBC #/AREA URNS AUTO: ABNORMAL /HPF

## 2019-04-03 PROCEDURE — 84443 ASSAY THYROID STIM HORMONE: CPT | Performed by: INTERNAL MEDICINE

## 2019-04-03 PROCEDURE — 81001 URINALYSIS AUTO W/SCOPE: CPT | Performed by: INTERNAL MEDICINE

## 2019-04-03 PROCEDURE — 36415 COLL VENOUS BLD VENIPUNCTURE: CPT | Performed by: INTERNAL MEDICINE

## 2019-04-03 PROCEDURE — 80053 COMPREHEN METABOLIC PANEL: CPT | Performed by: INTERNAL MEDICINE

## 2019-04-03 PROCEDURE — 80061 LIPID PANEL: CPT | Performed by: INTERNAL MEDICINE

## 2019-04-10 ENCOUNTER — OFFICE VISIT (OUTPATIENT)
Dept: INTERNAL MEDICINE | Facility: CLINIC | Age: 59
End: 2019-04-10
Payer: COMMERCIAL

## 2019-04-10 VITALS
DIASTOLIC BLOOD PRESSURE: 80 MMHG | BODY MASS INDEX: 34.48 KG/M2 | WEIGHT: 204 LBS | TEMPERATURE: 98.3 F | RESPIRATION RATE: 16 BRPM | HEART RATE: 73 BPM | SYSTOLIC BLOOD PRESSURE: 120 MMHG | OXYGEN SATURATION: 96 %

## 2019-04-10 DIAGNOSIS — E78.5 HYPERLIPIDEMIA LDL GOAL <160: Primary | ICD-10-CM

## 2019-04-10 DIAGNOSIS — I10 ESSENTIAL HYPERTENSION: ICD-10-CM

## 2019-04-10 PROCEDURE — 99214 OFFICE O/P EST MOD 30 MIN: CPT | Performed by: INTERNAL MEDICINE

## 2019-04-10 RX ORDER — LISINOPRIL 10 MG/1
10 TABLET ORAL DAILY
Qty: 90 TABLET | Refills: 3 | Status: SHIPPED | OUTPATIENT
Start: 2019-04-10 | End: 2020-07-06

## 2019-04-10 RX ORDER — SIMVASTATIN 10 MG
10 TABLET ORAL AT BEDTIME
Qty: 30 TABLET | Refills: 3 | Status: SHIPPED | OUTPATIENT
Start: 2019-04-10 | End: 2019-08-03

## 2019-04-10 NOTE — LETTER
[facility]  [address] [id number] [phone]        October 15, 2019    Katie Garcia  [address]  [facility] [address]              Dear Katie Garcia    This is to remind you that your fasting labs is due.    You may call our office at [phone] to schedule an appointment.    Please disregard this notice if you have already had your labs drawn or made an appointment.        Sincerely,        Enoc Huizar MD

## 2019-04-10 NOTE — PATIENT INSTRUCTIONS
- Start taking simvastatin once daily.  (Prescription has been sent to your pharmacy)  - Continue the lisinopril as you have been.    - Please come in for fasting labs in 3 months.  I will be in touch with you when I receive the results.

## 2019-04-10 NOTE — PROGRESS NOTES
SUBJECTIVE:   Katie Garcia is a 58 year old female who presents to clinic today for the following   health issues:      Hypertension Follow-up      Outpatient blood pressures are not being checked.    Low Salt Diet: not monitoring salt      Amount of exercise or physical activity: 4-5 days/week for an average of 45-60 minutes    Problems taking medications regularly: No    Medication side effects: none    Diet: regular (no restrictions)            Additional history:     Reviewed  and updated as needed this visit by clinical staff         Reviewed and updated as needed this visit by Provider  Tobacco  Allergies  Meds  Problems  Med Hx  Surg Hx  Fam Hx         Blood pressure noted, she has been tolerating the lisinopril well.  Follow-up surveillance labs showed normal creatinine and electrolytes.    Follow-up lipid panel remarkable for the following:  Lab Results   Component Value Date    HDL 64 04/03/2019     04/03/2019    CHOL 281 04/03/2019    TRIG 134 04/03/2019          ROS:  Constitutional, HEENT, cardiovascular, pulmonary, GI, , musculoskeletal, neuro, skin, endocrine and psych systems are negative, except as otherwise noted.    OBJECTIVE:     /80 (BP Location: Left arm, Patient Position: Chair, Cuff Size: Adult Large)   Pulse 73   Temp 98.3  F (36.8  C) (Oral)   Resp 16   Wt 92.5 kg (204 lb)   SpO2 96%   BMI 34.48 kg/m    Body mass index is 34.48 kg/m .  GENERAL: healthy, alert and no distress  NECK: no adenopathy, no asymmetry, masses, or scars and thyroid normal to palpation  RESP: lungs clear to auscultation - no rales, rhonchi or wheezes  CV: regular rate and rhythm, normal S1 S2, no S3 or S4, no murmur, click or rub, no peripheral edema and peripheral pulses strong  ABDOMEN: soft, nontender, no hepatosplenomegaly, no masses and bowel sounds normal  MS: no gross musculoskeletal defects noted, no edema        ASSESSMENT/PLAN:     1. Hyperlipidemia LDL goal  <160  Discussed options, will start low-dose simvastatin.  Recheck fasting labs in another 3 months.  - simvastatin (ZOCOR) 10 MG tablet; Take 1 tablet (10 mg) by mouth At Bedtime  Dispense: 30 tablet; Refill: 3  - Lipid panel reflex to direct LDL Fasting; Future  - Comprehensive metabolic panel; Future    2. Essential hypertension  Improved, well controlled, continue lisinopril.  Reiterated importance of dietary modification and weight loss.  - lisinopril (PRINIVIL/ZESTRIL) 10 MG tablet; Take 1 tablet (10 mg) by mouth daily  Dispense: 90 tablet; Refill: 3    See Patient Instructions    Enoc Huizar MD  [facility]

## 2019-05-01 ENCOUNTER — TRANSFERRED RECORDS (OUTPATIENT)
Dept: HEALTH INFORMATION MANAGEMENT | Facility: CLINIC | Age: 59
End: 2019-05-01

## 2019-07-05 DIAGNOSIS — F33.0 MAJOR DEPRESSIVE DISORDER, RECURRENT EPISODE, MILD (H): ICD-10-CM

## 2019-07-05 RX ORDER — TRAZODONE HYDROCHLORIDE 50 MG/1
50 TABLET, FILM COATED ORAL AT BEDTIME
Qty: 90 TABLET | Refills: 1 | Status: SHIPPED | OUTPATIENT
Start: 2019-07-05 | End: 2019-12-30

## 2019-07-05 NOTE — TELEPHONE ENCOUNTER
"Requested Prescriptions   Pending Prescriptions Disp Refills     traZODone (DESYREL) 50 MG tablet [Pharmacy Med Name: TRAZODONE 50 MG TABLET] 90 tablet 1     Sig: TAKE 1 TABLET (50 MG) BY MOUTH AT BEDTIME       Serotonin Modulators Passed - 7/5/2019  1:34 AM        Passed - Recent (12 mo) or future (30 days) visit within the authorizing provider's specialty     Patient had office visit in the last 12 months or has a visit in the next 30 days with authorizing provider or within the authorizing provider's specialty.  See \"Patient Info\" tab in inbasket, or \"Choose Columns\" in Meds & Orders section of the refill encounter.              Passed - Medication is active on med list        Passed - Patient is age 18 or older        Passed - No active pregnancy on record        Passed - No positive pregnancy test in past 12 months        Last Written Prescription Date:  01/08/2019  Last Fill Quantity: 90,  # refills: 1   Last office visit: 4/10/2019 with prescribing provider:  Dr Huizar   Future Office Visit:      "

## 2019-07-05 NOTE — TELEPHONE ENCOUNTER
Due for phq9\\sent message via mcyahrt  Left message on answering machine for patient to call back or complete Proximal Datat message        PHQ-9 SCORE 9/3/2015 11/15/2017 8/1/2018   PHQ-9 Total Score - - -   PHQ-9 Total Score 2 3 1   [phone]    Thank You,    Genesis CORTESRN Triage  [facility]  [phone]

## 2019-08-03 DIAGNOSIS — E78.5 HYPERLIPIDEMIA LDL GOAL <160: ICD-10-CM

## 2019-08-03 NOTE — TELEPHONE ENCOUNTER
"Requested Prescriptions   Pending Prescriptions Disp Refills     simvastatin (ZOCOR) 10 MG tablet [Pharmacy Med Name: SIMVASTATIN 10 MG TABLET] 30 tablet 3     Sig: TAKE 1 TABLET BY MOUTH EVERYDAY AT BEDTIME   Last Written Prescription Date:  4/10/2019  Last Fill Quantity: 30,  # refills: 3   Last Office Visit: 4/10/2019   Future Office Visit:         Statins Protocol Passed - 8/3/2019 12:29 AM        Passed - LDL on file in past 12 months     Recent Labs   Lab Test 04/03/19  0919   *             Passed - No abnormal creatine kinase in past 12 months     No lab results found.             Passed - Recent (12 mo) or future (30 days) visit within the authorizing provider's specialty     Patient had office visit in the last 12 months or has a visit in the next 30 days with authorizing provider or within the authorizing provider's specialty.  See \"Patient Info\" tab in inbasket, or \"Choose Columns\" in Meds & Orders section of the refill encounter.              Passed - Medication is active on med list        Passed - Patient is age 18 or older        Passed - No active pregnancy on record        Passed - No positive pregnancy test in past 12 months          "

## 2019-08-05 RX ORDER — SIMVASTATIN 10 MG
TABLET ORAL
Qty: 90 TABLET | Refills: 2 | Status: SHIPPED | OUTPATIENT
Start: 2019-08-05 | End: 2020-04-30

## 2019-10-24 DIAGNOSIS — E78.5 HYPERLIPIDEMIA LDL GOAL <160: ICD-10-CM

## 2019-10-24 LAB
ALBUMIN SERPL-MCNC: 3.6 G/DL (ref 3.4–5)
ALP SERPL-CCNC: 67 U/L (ref 40–150)
ALT SERPL W P-5'-P-CCNC: 18 U/L (ref 0–50)
ANION GAP SERPL CALCULATED.3IONS-SCNC: 7 MMOL/L (ref 3–14)
AST SERPL W P-5'-P-CCNC: 8 U/L (ref 0–45)
BILIRUB SERPL-MCNC: 0.4 MG/DL (ref 0.2–1.3)
BUN SERPL-MCNC: 14 MG/DL (ref 7–30)
CALCIUM SERPL-MCNC: 8.8 MG/DL (ref 8.5–10.1)
CHLORIDE SERPL-SCNC: 104 MMOL/L (ref 94–109)
CHOLEST SERPL-MCNC: 191 MG/DL
CO2 SERPL-SCNC: 28 MMOL/L (ref 20–32)
CREAT SERPL-MCNC: 0.82 MG/DL (ref 0.52–1.04)
GFR SERPL CREATININE-BSD FRML MDRD: 78 ML/MIN/{1.73_M2}
GLUCOSE SERPL-MCNC: 89 MG/DL (ref 70–99)
HDLC SERPL-MCNC: 61 MG/DL
LDLC SERPL CALC-MCNC: 112 MG/DL
NONHDLC SERPL-MCNC: 130 MG/DL
POTASSIUM SERPL-SCNC: 3.9 MMOL/L (ref 3.4–5.3)
PROT SERPL-MCNC: 6.4 G/DL (ref 6.8–8.8)
SODIUM SERPL-SCNC: 139 MMOL/L (ref 133–144)
TRIGL SERPL-MCNC: 90 MG/DL

## 2019-10-24 PROCEDURE — 36415 COLL VENOUS BLD VENIPUNCTURE: CPT | Performed by: INTERNAL MEDICINE

## 2019-10-24 PROCEDURE — 80061 LIPID PANEL: CPT | Performed by: INTERNAL MEDICINE

## 2019-10-24 PROCEDURE — 80053 COMPREHEN METABOLIC PANEL: CPT | Performed by: INTERNAL MEDICINE

## 2019-11-25 ENCOUNTER — OFFICE VISIT (OUTPATIENT)
Dept: INTERNAL MEDICINE | Facility: CLINIC | Age: 59
End: 2019-11-25
Payer: COMMERCIAL

## 2019-11-25 VITALS
BODY MASS INDEX: 31.4 KG/M2 | DIASTOLIC BLOOD PRESSURE: 80 MMHG | HEART RATE: 73 BPM | TEMPERATURE: 98.1 F | WEIGHT: 185.8 LBS | OXYGEN SATURATION: 98 % | SYSTOLIC BLOOD PRESSURE: 130 MMHG | RESPIRATION RATE: 16 BRPM

## 2019-11-25 DIAGNOSIS — Z01.818 PREOP GENERAL PHYSICAL EXAM: Primary | ICD-10-CM

## 2019-11-25 DIAGNOSIS — M17.11 PRIMARY LOCALIZED OSTEOARTHROSIS OF THE KNEE, RIGHT: ICD-10-CM

## 2019-11-25 DIAGNOSIS — I10 ESSENTIAL HYPERTENSION: ICD-10-CM

## 2019-11-25 DIAGNOSIS — F32.0 MILD MAJOR DEPRESSION (H): ICD-10-CM

## 2019-11-25 LAB
ERYTHROCYTE [DISTWIDTH] IN BLOOD BY AUTOMATED COUNT: 12.7 % (ref 10–15)
HCT VFR BLD AUTO: 44.1 % (ref 35–47)
HGB BLD-MCNC: 14.9 G/DL (ref 11.7–15.7)
MCH RBC QN AUTO: 29.9 PG (ref 26.5–33)
MCHC RBC AUTO-ENTMCNC: 33.8 G/DL (ref 31.5–36.5)
MCV RBC AUTO: 88 FL (ref 78–100)
PLATELET # BLD AUTO: 318 10E9/L (ref 150–450)
RBC # BLD AUTO: 4.99 10E12/L (ref 3.8–5.2)
WBC # BLD AUTO: 9.4 10E9/L (ref 4–11)

## 2019-11-25 PROCEDURE — 93000 ELECTROCARDIOGRAM COMPLETE: CPT | Performed by: INTERNAL MEDICINE

## 2019-11-25 PROCEDURE — 36415 COLL VENOUS BLD VENIPUNCTURE: CPT | Performed by: INTERNAL MEDICINE

## 2019-11-25 PROCEDURE — 99214 OFFICE O/P EST MOD 30 MIN: CPT | Performed by: INTERNAL MEDICINE

## 2019-11-25 PROCEDURE — 85027 COMPLETE CBC AUTOMATED: CPT | Performed by: INTERNAL MEDICINE

## 2019-11-25 NOTE — PROGRESS NOTES
[address] [id number] [phone]  Dept: [phone]    PRE-OP EVALUATION:  Today's date: 2019    Katie Garcia (: 1960) presents for pre-operative evaluation assessment as requested by Dr. Wylie.  She requires evaluation and anesthesia risk assessment prior to undergoing surgery/procedure for treatment of OA of right knee.    Fax number for surgical facility: [facility] [phone]  Primary Physician: Enoc Huizar  Type of Anesthesia Anticipated: General    Patient has a Health Care Directive or Living Will:  NO    Preop Questions 2019   Who is doing your surgery? Dr Wylie   What are you having done? Total knee replacement (right knee)   Date of Surgery/Procedure: Dec 3 2019   Facility or Hospital where procedure/surgery will be performed: [facility]   1.  Do you have a history of Heart attack, stroke, stent, coronary bypass surgery, or other heart surgery? No   2.  Do you ever have any pain or discomfort in your chest? No   3.  Do you have a history of  Heart Failure? No   4.   Are you troubled by shortness of breath when:  walking on a level surface, or up a slight hill, or at night? No   5.  Do you currently have a cold, bronchitis or other respiratory infection? No   6.  Do you have a cough, shortness of breath, or wheezing? No   7.  Do you sometimes get pains in the calves of your legs when you walk? No   8. Do you or anyone in your family have previous history of blood clots? No   9.  Do you or does anyone in your family have a serious bleeding problem such as prolonged bleeding following surgeries or cuts? No   10. Have you ever had problems with anemia or been told to take iron pills? No   11. Have you had any abnormal blood loss such as black, tarry or bloody stools, or abnormal vaginal bleeding? No   12. Have you ever had a blood transfusion? No   13. Have you or any of your relatives ever had  problems with anesthesia? No   14. Do you have sleep apnea, excessive snoring or daytime drowsiness? No   15. Do you have any prosthetic heart valves? No   16. Do you have prosthetic joints? No   17. Is there any chance that you may be pregnant? No         HPI:       HYPERTENSION - Patient has longstanding history of HTN , currently denies any symptoms referable to elevated blood pressure. Specifically denies chest pain, palpitations, dyspnea, orthopnea, PND or peripheral edema. Blood pressure readings have been in normal range. Current medication regimen is as listed below. Patient denies any side effects of medication.       MEDICAL HISTORY:     Patient Active Problem List    Diagnosis Date Noted     Essential hypertension 03/13/2019     Priority: Medium     Morbid obesity (H) 08/01/2018     Priority: Medium     Other disorder of menstruation and other abnormal bleeding from female genital tract 11/30/2010     Priority: Medium     HYPERLIPIDEMIA LDL GOAL <160 10/31/2010     Priority: Medium     Hyperlipidemia      Priority: Medium     Mild major depression (H)      Priority: Medium      Past Medical History:   Diagnosis Date     Hyperlipidemia      Mild major depression (H)      Past Surgical History:   Procedure Laterality Date     NO HISTORY OF SURGERY       Current Outpatient Medications   Medication Sig Dispense Refill     lisinopril (PRINIVIL/ZESTRIL) 10 MG tablet Take 1 tablet (10 mg) by mouth daily 90 tablet 3     simvastatin (ZOCOR) 10 MG tablet TAKE 1 TABLET BY MOUTH EVERYDAY AT BEDTIME 90 tablet 2     traZODone (DESYREL) 50 MG tablet TAKE 1 TABLET (50 MG) BY MOUTH AT BEDTIME 90 tablet 1     OTC products: NSAIDS    Allergies   Allergen Reactions     Penicillins Hives     Prozac [Fluoxetine Hcl] Hives      Latex Allergy: NO    Social History     Tobacco Use     Smoking status: Never Smoker     Smokeless tobacco: Never Used   Substance Use Topics     Alcohol use: No     History   Drug Use No       REVIEW OF  SYSTEMS:   Constitutional, neuro, ENT, endocrine, pulmonary, cardiac, gastrointestinal, genitourinary, musculoskeletal, integument and psychiatric systems are negative, except as otherwise noted.    EXAM:   /80 (BP Location: Left arm, Patient Position: Chair, Cuff Size: Adult Regular)   Pulse 73   Temp 98.1  F (36.7  C) (Oral)   Resp 16   Wt 84.3 kg (185 lb 12.8 oz)   SpO2 98%   BMI 31.40 kg/m    GENERAL APPEARANCE: healthy, alert and no distress  HENT: ear canals and TM's normal and nose and mouth without ulcers or lesions  RESP: lungs clear to auscultation - no rales, rhonchi or wheezes  CV: regular rate and rhythm, normal S1 S2, no S3 or S4 and no murmur, click or rub   ABDOMEN: soft, nontender, no HSM or masses and bowel sounds normal  NEURO: Normal strength and tone, sensory exam grossly normal, mentation intact and speech normal    DIAGNOSTICS:   EKG: appears normal, NSR, normal axis, normal intervals, no acute ST/T changes c/w ischemia, no LVH by voltage criteria    Recent Labs   Lab Test 10/24/19  0918 04/03/19  0919  01/17/12  1630   HGB  --   --   --  14.1    137   < >  --    POTASSIUM 3.9 3.8   < >  --    CR 0.82 0.74   < >  --     < > = values in this interval not displayed.        IMPRESSION:   Reason for surgery/procedure: OA of right knee  Diagnosis/reason for consult: HTN    The proposed surgical procedure is considered LOW risk.    REVISED CARDIAC RISK INDEX  The patient has the following serious cardiovascular risks for perioperative complications such as (MI, PE, VFib and 3  AV Block):  No serious cardiac risks  INTERPRETATION: 0 risks: Class I (very low risk - 0.4% complication rate)    The patient has the following additional risks for perioperative complications:  No identified additional risks      ICD-10-CM    1. Preop general physical exam Z01.818 EKG 12-lead complete w/read - Clinics     CBC with platelets   2. Primary localized osteoarthrosis of the knee, right M17.11     3. Essential hypertension I10    4. Mild major depression (H) F32.0        RECOMMENDATIONS:         --Patient is to take all scheduled medications on the day of surgery EXCEPT for modifications listed below.    Anticoagulant or Antiplatelet Medication Use  NSAIDS: Naproxen (Naprosyn):   Stop 2-3 days prior to surgery        APPROVAL GIVEN to proceed with proposed procedure, without further diagnostic evaluation       Signed Electronically by: Enoc Huizar MD    Copy of this evaluation report is provided to requesting physician.    [facility] Preop Guidelines    Revised Cardiac Risk Index

## 2019-12-09 ENCOUNTER — HEALTH MAINTENANCE LETTER (OUTPATIENT)
Age: 59
End: 2019-12-09

## 2019-12-30 DIAGNOSIS — F33.0 MAJOR DEPRESSIVE DISORDER, RECURRENT EPISODE, MILD (H): ICD-10-CM

## 2019-12-30 RX ORDER — TRAZODONE HYDROCHLORIDE 50 MG/1
50 TABLET, FILM COATED ORAL AT BEDTIME
Qty: 90 TABLET | Refills: 1 | Status: SHIPPED | OUTPATIENT
Start: 2019-12-30 | End: 2020-06-23

## 2019-12-30 NOTE — TELEPHONE ENCOUNTER
"Requested Prescriptions   Pending Prescriptions Disp Refills     traZODone (DESYREL) 50 MG tablet [Pharmacy Med Name: TRAZODONE 50 MG TABLET] 90 tablet 1     Sig: TAKE 1 TABLET (50 MG) BY MOUTH AT BEDTIME       Serotonin Modulators Passed - 12/30/2019  2:57 AM        Passed - Recent (12 mo) or future (30 days) visit within the authorizing provider's specialty     Patient has had an office visit with the authorizing provider or a provider within the authorizing providers department within the previous 12 mos or has a future within next 30 days. See \"Patient Info\" tab in inbasket, or \"Choose Columns\" in Meds & Orders section of the refill encounter.              Passed - Medication is active on med list        Passed - Patient is age 18 or older        Passed - No active pregnancy on record        Passed - No positive pregnancy test in past 12 months        Last Written Prescription Date:  7/5/19  Last Fill Quantity: 90,  # refills: 1   Last office visit: 11/25/2019 with prescribing provider:  11/25/19   Future Office Visit:      "

## 2019-12-30 NOTE — TELEPHONE ENCOUNTER
Prescription approved per [facility] [place] Refill Protocol.    Ashleigh RALPHN, RN, PHN

## 2020-02-14 ENCOUNTER — OFFICE VISIT (OUTPATIENT)
Dept: URGENT CARE | Facility: URGENT CARE | Age: 60
End: 2020-02-14
Payer: COMMERCIAL

## 2020-02-14 VITALS
SYSTOLIC BLOOD PRESSURE: 142 MMHG | TEMPERATURE: 97.5 F | DIASTOLIC BLOOD PRESSURE: 91 MMHG | HEIGHT: 65 IN | HEART RATE: 67 BPM | OXYGEN SATURATION: 98 % | BODY MASS INDEX: 29.66 KG/M2 | RESPIRATION RATE: 17 BRPM | WEIGHT: 178 LBS

## 2020-02-14 DIAGNOSIS — L03.031 PARONYCHIA OF TOE, RIGHT: Primary | ICD-10-CM

## 2020-02-14 PROCEDURE — 99213 OFFICE O/P EST LOW 20 MIN: CPT | Performed by: FAMILY MEDICINE

## 2020-02-14 RX ORDER — SULFAMETHOXAZOLE/TRIMETHOPRIM 800-160 MG
1 TABLET ORAL 2 TIMES DAILY
Qty: 20 TABLET | Refills: 0 | Status: SHIPPED | OUTPATIENT
Start: 2020-02-14 | End: 2020-02-24

## 2020-02-14 ASSESSMENT — MIFFLIN-ST. JEOR: SCORE: 1375.34

## 2020-02-14 NOTE — PATIENT INSTRUCTIONS
Patient Education     Paronychia of the Finger or Toe  Paronychia is an infection near a fingernail or toenail. It usually occurs when an opening in the cuticle or an ingrown toenail lets bacteria under the skin.  The infection will need to be drained if pus is present. If the infection has been caught early, you may need only antibiotic treatment. Healing will take about 1 to 2 weeks.  Home care  Follow these guidelines when caring for yourself at home:    Clean and soak the toe or finger. Do this 2 times a day for the first 3 days. To do so:  ? Soak your foot or hand in a tub of warm water for 5 minutes. Or hold your toe or finger under a faucet of warm running water for 5 minutes.  ? Clean any crust away with soap and water using a cotton swab.  ? Put antibiotic ointment on the infected area.    Change the dressing daily or any time it gets dirty.    If you were given antibiotics, take them as directed until they are all gone.    If your infection is on a toe, wear comfortable shoes with a lot of toe room. You can also wear open-toed sandals while your toe heals.    You may use over-the-counter medicine (acetaminophen or ibuprofen to help with pain, unless another medicine was prescribed. If you have chronic liver or kidney disease, talk with your healthcare provider before using these medicines. Also talk with your provider if you've had a stomach ulcer or GI (gastrointestinal) bleeding.  Prevention  The following can prevent paronychia:    Avoid cutting or playing with your cuticles at home.    Don't bite your nails.    Don't suck on your thumbs or fingers.  Follow-up care  Follow up with your healthcare provider, or as advised.  When to seek medical advice  Call your healthcare provider right away if any of these occur:    Redness, pain, or swelling of the finger or toe gets worse    Red streaks in the skin leading away from the wound    Pus or fluid draining from the nail area    Fever of 100.4 F (38 C) or  higher, or as directed by your provider  Date Last Reviewed: 8/1/2016 2000-2019 [employer]. [address] [place] [address], [place] [address]. All rights reserved. This information is not intended as a substitute for professional medical care. Always follow your healthcare professional's instructions.

## 2020-02-16 NOTE — PROGRESS NOTES
"SUBJECTIVE:  Katie Garcia, a 59 year old female scheduled an appointment to discuss the following issues:  Paronychia of toe, right    Medical, social, surgical, and family histories reviewed.    Urgent Care    Infection (Pt states she had gone to get a pedicure and was told she has an infection on two of her right foot toes state she has had pain but thought it was related to her knee pain )   Pt had right knee replacement on Dec 3, 2019.    ROS:  See HPI.  No nausea/vomiting.  No fever/chills.  No chest pain/SOB.  No BM/urine problems.  No dizziness or syncope.      OBJECTIVE:  BP (!) 142/91   Pulse 67   Temp 97.5  F (36.4  C) (Oral)   Resp 17   Ht 1.638 m (5' 4.5\")   Wt 80.7 kg (178 lb)   SpO2 98%   BMI 30.08 kg/m    EXAM:  GENERAL APPEARANCE: alert and no distress; afebrile, not septic  EYES: Eyes grossly normal to inspection, PERRL and conjunctivae and sclerae normal  HENT: ear canals and TM's normal and nose and mouth without ulcers or lesions  NECK: no adenopathy, no asymmetry, masses, or scars and neck normal  RESP: lungs clear to auscultation - no rales, rhonchi or wheezes  CV: regular rates and rhythm, normal S1 S2, no S3 or S4 and no murmur, click or rub  MS: extremities normal- no gross deformities noted  SKIN: paronychial infection right great and second toe ---some erythema, swelling & tenderness medial aspect of right  second toe and lateral distal aspect of right great toe; no other suspicious lesions or rashes; no ascending lymphangitis  NEURO: Normal strength and tone, mentation intact and speech normal      ASSESSMENT/PLAN:  (L03.031) Paronychia of toe, right  (primary encounter diagnosis)  Plan: sulfamethoxazole-trimethoprim (BACTRIM DS)         800-160 MG tablet  Care instructions given.  Pt to f/up PCP within 1 week if no improvement or new problems arise .  Warning signs and symptoms explained----be seen ASAP if worsening.        "

## 2020-02-27 ENCOUNTER — TELEPHONE (OUTPATIENT)
Dept: INTERNAL MEDICINE | Facility: CLINIC | Age: 60
End: 2020-02-27

## 2020-02-27 NOTE — TELEPHONE ENCOUNTER
Panel Management Review      Patient Active Problem List   Diagnosis     Mild major depression (H)     Hyperlipidemia     HYPERLIPIDEMIA LDL GOAL <160     Other disorder of menstruation and other abnormal bleeding from female genital tract     Morbid obesity (H)     Essential hypertension         Composite cancer screening  Chart review shows that this patient is due/due soon for the following Pap Smear  Summary:    Patient is due/failing the following:   PAP, PHQ9 and PHYSICAL    Action needed:   Patient needs office visit for Physical, Pap, and Patient needs to do PHQ9.    Type of outreach:    Sent letter.    Questions for provider review:    None                                                                                                                                    Rosa Cedillo, CMA

## 2020-02-27 NOTE — LETTER
[facility]  [address] [phone]  February 27, 2020    Katie Garcia  [address]  [facility] [address]    Dear Katie,    We care about your health and based on a review of your medical records, recommend the the following, to better manage your health:      You are in particular need of attention regarding:  -Cervical Cancer Screening  -Wellness (Physical) Visit     I am recommending that you:     -schedule a WELLNESS (Physical) APPOINTMENT with me.   I will check fasting labs the same day - nothing to eat except water and meds for 8-10 hours prior. (If you go elsewhere for Wellness visits then please disregard this reminder.)    -schedule a PAP SMEAR EXAM which is due.  Please disregard this reminder if you have had this exam elsewhere within the last year.  It would be helpful for us to have a copy of your recent pap smear report in our file so that we can best coordinate your care.    If you are under/uninsured, we recommend you contact the [facility]. They offer pap smears at no charge or on a sliding fee charge. You can schedule with them at [phone]. Please have them send us the results.    Please complete the enclosed PHQ9 and mail back to clinic in the envelope provided.         Here is a list of Health Maintenance topics that are due now or due soon:  Health Maintenance Due   Topic Date Due     Discuss Advance Care Planning  1960     HIV Screening  05/11/1975     Zoster (Shingles) Vaccine (1 of 2) 05/11/2010     PAP Smear  09/03/2018     Preventive Care Visit  11/15/2018     Flu Vaccine (1) 09/01/2019     Depression Assessment  01/05/2020       Please call us at [phone] or [phone] (or use [employer]) to address the above recommendations.     Thank you for trusting [facility].  We appreciate the opportunity to serve you and look forward to supporting your healthcare needs in  the future.    If you have (or plan to have) any of these tests done at a facility other than a [facility] or a [facility], please have the results from these tests sent to your primary physician at [facility].    Healthy Regards,    Woodrow Huizar MD/Rosa Cedillo CMA

## 2020-03-15 ENCOUNTER — HEALTH MAINTENANCE LETTER (OUTPATIENT)
Age: 60
End: 2020-03-15

## 2020-04-30 DIAGNOSIS — E78.5 HYPERLIPIDEMIA LDL GOAL <160: ICD-10-CM

## 2020-04-30 RX ORDER — SIMVASTATIN 10 MG
TABLET ORAL
Qty: 90 TABLET | Refills: 1 | Status: SHIPPED | OUTPATIENT
Start: 2020-04-30 | End: 2020-10-22

## 2020-06-22 DIAGNOSIS — F33.0 MAJOR DEPRESSIVE DISORDER, RECURRENT EPISODE, MILD (H): ICD-10-CM

## 2020-06-23 RX ORDER — TRAZODONE HYDROCHLORIDE 50 MG/1
50 TABLET, FILM COATED ORAL AT BEDTIME
Qty: 90 TABLET | Refills: 1 | Status: SHIPPED | OUTPATIENT
Start: 2020-06-23 | End: 2020-10-22

## 2020-07-06 DIAGNOSIS — I10 ESSENTIAL HYPERTENSION: ICD-10-CM

## 2020-07-07 RX ORDER — LISINOPRIL 10 MG/1
10 TABLET ORAL DAILY
Qty: 90 TABLET | Refills: 0 | Status: SHIPPED | OUTPATIENT
Start: 2020-07-07 | End: 2020-10-01

## 2020-10-01 DIAGNOSIS — I10 ESSENTIAL HYPERTENSION: ICD-10-CM

## 2020-10-01 RX ORDER — LISINOPRIL 10 MG/1
TABLET ORAL
Qty: 90 TABLET | Refills: 3 | Status: SHIPPED | OUTPATIENT
Start: 2020-10-01 | End: 2021-05-20

## 2020-10-22 DIAGNOSIS — E78.5 HYPERLIPIDEMIA LDL GOAL <160: ICD-10-CM

## 2020-10-22 DIAGNOSIS — F33.0 MAJOR DEPRESSIVE DISORDER, RECURRENT EPISODE, MILD (H): ICD-10-CM

## 2020-10-22 RX ORDER — TRAZODONE HYDROCHLORIDE 50 MG/1
TABLET, FILM COATED ORAL
Qty: 90 TABLET | Refills: 0 | Status: SHIPPED | OUTPATIENT
Start: 2020-10-22 | End: 2021-05-19

## 2020-10-22 RX ORDER — SIMVASTATIN 10 MG
TABLET ORAL
Qty: 90 TABLET | Refills: 0 | Status: SHIPPED | OUTPATIENT
Start: 2020-10-22 | End: 2021-01-18

## 2021-01-14 ENCOUNTER — HEALTH MAINTENANCE LETTER (OUTPATIENT)
Age: 61
End: 2021-01-14

## 2021-01-18 DIAGNOSIS — E78.5 HYPERLIPIDEMIA LDL GOAL <160: ICD-10-CM

## 2021-01-19 NOTE — TELEPHONE ENCOUNTER
Routing refill request to provider for review/approval because:  Labs out of range:  Lipid panal

## 2021-01-20 RX ORDER — SIMVASTATIN 10 MG
10 TABLET ORAL AT BEDTIME
Qty: 90 TABLET | Refills: 3 | Status: SHIPPED | OUTPATIENT
Start: 2021-01-20 | End: 2022-01-12

## 2021-05-03 DIAGNOSIS — F33.0 MAJOR DEPRESSIVE DISORDER, RECURRENT EPISODE, MILD (H): ICD-10-CM

## 2021-05-05 RX ORDER — TRAZODONE HYDROCHLORIDE 50 MG/1
50 TABLET, FILM COATED ORAL AT BEDTIME
Qty: 90 TABLET | Refills: 0 | OUTPATIENT
Start: 2021-05-05

## 2021-05-05 NOTE — TELEPHONE ENCOUNTER
Patient has not been seen since 2019.    Please assist with scheduling an appointment.    Ashleigh RALPHN, RN, PHN

## 2021-05-13 NOTE — TELEPHONE ENCOUNTER
Patient called, scheduled in person visit for 5/20. She will be out of this medication before 5/20.

## 2021-05-19 RX ORDER — TRAZODONE HYDROCHLORIDE 50 MG/1
50 TABLET, FILM COATED ORAL AT BEDTIME
Qty: 90 TABLET | Refills: 0 | Status: SHIPPED | OUTPATIENT
Start: 2021-05-19 | End: 2021-05-20

## 2021-05-20 ENCOUNTER — OFFICE VISIT (OUTPATIENT)
Dept: INTERNAL MEDICINE | Facility: CLINIC | Age: 61
End: 2021-05-20
Payer: COMMERCIAL

## 2021-05-20 VITALS
WEIGHT: 175 LBS | OXYGEN SATURATION: 99 % | BODY MASS INDEX: 29.57 KG/M2 | TEMPERATURE: 97.9 F | SYSTOLIC BLOOD PRESSURE: 122 MMHG | HEART RATE: 60 BPM | DIASTOLIC BLOOD PRESSURE: 60 MMHG

## 2021-05-20 DIAGNOSIS — F33.0 MAJOR DEPRESSIVE DISORDER, RECURRENT EPISODE, MILD (H): ICD-10-CM

## 2021-05-20 DIAGNOSIS — Z12.31 ENCOUNTER FOR SCREENING MAMMOGRAM FOR BREAST CANCER: ICD-10-CM

## 2021-05-20 DIAGNOSIS — I10 ESSENTIAL HYPERTENSION: Primary | ICD-10-CM

## 2021-05-20 LAB
ALBUMIN SERPL-MCNC: 3.5 G/DL (ref 3.4–5)
ALP SERPL-CCNC: 75 U/L (ref 40–150)
ALT SERPL W P-5'-P-CCNC: 21 U/L (ref 0–50)
ANION GAP SERPL CALCULATED.3IONS-SCNC: 1 MMOL/L (ref 3–14)
AST SERPL W P-5'-P-CCNC: 12 U/L (ref 0–45)
BILIRUB SERPL-MCNC: 0.4 MG/DL (ref 0.2–1.3)
BUN SERPL-MCNC: 12 MG/DL (ref 7–30)
CALCIUM SERPL-MCNC: 9 MG/DL (ref 8.5–10.1)
CHLORIDE SERPL-SCNC: 104 MMOL/L (ref 94–109)
CHOLEST SERPL-MCNC: 207 MG/DL
CO2 SERPL-SCNC: 33 MMOL/L (ref 20–32)
CREAT SERPL-MCNC: 0.92 MG/DL (ref 0.52–1.04)
GFR SERPL CREATININE-BSD FRML MDRD: 67 ML/MIN/{1.73_M2}
GLUCOSE SERPL-MCNC: 85 MG/DL (ref 70–99)
HDLC SERPL-MCNC: 67 MG/DL
LDLC SERPL CALC-MCNC: 121 MG/DL
NONHDLC SERPL-MCNC: 140 MG/DL
POTASSIUM SERPL-SCNC: 4.7 MMOL/L (ref 3.4–5.3)
PROT SERPL-MCNC: 6.7 G/DL (ref 6.8–8.8)
SODIUM SERPL-SCNC: 138 MMOL/L (ref 133–144)
TRIGL SERPL-MCNC: 94 MG/DL

## 2021-05-20 PROCEDURE — 99213 OFFICE O/P EST LOW 20 MIN: CPT | Performed by: INTERNAL MEDICINE

## 2021-05-20 PROCEDURE — 80053 COMPREHEN METABOLIC PANEL: CPT | Performed by: INTERNAL MEDICINE

## 2021-05-20 PROCEDURE — 80061 LIPID PANEL: CPT | Performed by: INTERNAL MEDICINE

## 2021-05-20 PROCEDURE — 36415 COLL VENOUS BLD VENIPUNCTURE: CPT | Performed by: INTERNAL MEDICINE

## 2021-05-20 RX ORDER — TRAZODONE HYDROCHLORIDE 50 MG/1
50 TABLET, FILM COATED ORAL AT BEDTIME
Qty: 90 TABLET | Refills: 3 | Status: SHIPPED | OUTPATIENT
Start: 2021-05-20 | End: 2022-08-02

## 2021-05-20 RX ORDER — LISINOPRIL 10 MG/1
10 TABLET ORAL DAILY
Qty: 90 TABLET | Refills: 3 | Status: SHIPPED | OUTPATIENT
Start: 2021-05-20 | End: 2023-12-28

## 2021-05-20 NOTE — PROGRESS NOTES
Assessment & Plan     Essential hypertension  Stable, continue lisinopril  - lisinopril (ZESTRIL) 10 MG tablet; Take 1 tablet (10 mg) by mouth daily  - Lipid panel reflex to direct LDL Fasting  - Comprehensive metabolic panel (BMP + Alb, Alk Phos, ALT, AST, Total. Bili, TP)    Major depressive disorder, recurrent episode, mild (H)    - traZODone (DESYREL) 50 MG tablet; Take 1 tablet (50 mg) by mouth At Bedtime    Encounter for screening mammogram for breast cancer    - *MA Screening Digital Bilateral; Future             See Patient Instructions    No follow-ups on file.    Enoc Huizar MD  [facility]    Diana Wilson is a 61 year old who presents for the following health issues     HPI     Hypertension Follow-up      Do you check your blood pressure regularly outside of the clinic? No     Are you following a low salt diet? No    Are your blood pressures ever more than 140 on the top number (systolic) OR more   than 90 on the bottom number (diastolic), for example 140/90? No      How many servings of fruits and vegetables do you eat daily?  4 or more    On average, how many sweetened beverages do you drink each day (Examples: soda, juice, sweet tea, etc.  Do NOT count diet or artificially sweetened beverages)?   0    How many days per week do you exercise enough to make your heart beat faster? 5    How many minutes a day do you exercise enough to make your heart beat faster? 30 - 60    How many days per week do you miss taking your medication? 0        Review of Systems   Constitutional, HEENT, cardiovascular, pulmonary, GI, , musculoskeletal, neuro, skin, endocrine and psych systems are negative, except as otherwise noted.      Objective    /60   Pulse 60   Temp 97.9  F (36.6  C) (Oral)   Wt 79.4 kg (175 lb)   SpO2 99%   BMI 29.57 kg/m    Body mass index is 29.57 kg/m .  Physical Exam   GENERAL: healthy, alert and no distress  NECK: no adenopathy, no  asymmetry, masses, or scars and thyroid normal to palpation  RESP: lungs clear to auscultation - no rales, rhonchi or wheezes  CV: regular rate and rhythm, normal S1 S2, no S3 or S4, no murmur, click or rub, no peripheral edema and peripheral pulses strong  ABDOMEN: soft, nontender, no hepatosplenomegaly, no masses and bowel sounds normal  MS: no gross musculoskeletal defects noted, no edema

## 2021-07-03 ENCOUNTER — HEALTH MAINTENANCE LETTER (OUTPATIENT)
Age: 61
End: 2021-07-03

## 2021-10-23 ENCOUNTER — HEALTH MAINTENANCE LETTER (OUTPATIENT)
Age: 61
End: 2021-10-23

## 2022-01-09 DIAGNOSIS — E78.5 HYPERLIPIDEMIA LDL GOAL <160: ICD-10-CM

## 2022-01-12 RX ORDER — SIMVASTATIN 10 MG
TABLET ORAL
Qty: 90 TABLET | Refills: 1 | Status: SHIPPED | OUTPATIENT
Start: 2022-01-12 | End: 2022-07-08

## 2022-02-12 ENCOUNTER — HEALTH MAINTENANCE LETTER (OUTPATIENT)
Age: 62
End: 2022-02-12

## 2022-07-06 DIAGNOSIS — E78.5 HYPERLIPIDEMIA LDL GOAL <160: ICD-10-CM

## 2022-07-07 NOTE — TELEPHONE ENCOUNTER
Patient overdue for annual wellness exam, please help patient schedule for further medication refills    Patient Contact    Attempt # 1    Was call answered?  Patient answered phone and hung up x2.

## 2022-07-08 RX ORDER — SIMVASTATIN 10 MG
TABLET ORAL
Qty: 90 TABLET | Refills: 1 | Status: SHIPPED | OUTPATIENT
Start: 2022-07-08 | End: 2023-12-28

## 2022-07-08 NOTE — TELEPHONE ENCOUNTER
Routing refill request to provider for review/approval because:  LDL Cholesterol Calculated   Date Value Ref Range Status   05/20/2021 121 (H) <100 mg/dL Final     Comment:     Above desirable:  100-129 mg/dl  Borderline High:  130-159 mg/dL  High:             160-189 mg/dL  Very high:       >189 mg/dl       please route to team to contact patient for appointment       Bhavesh Holloway RN  [facility] Triage Nurse

## 2022-08-01 DIAGNOSIS — F33.0 MAJOR DEPRESSIVE DISORDER, RECURRENT EPISODE, MILD (H): ICD-10-CM

## 2022-08-02 RX ORDER — TRAZODONE HYDROCHLORIDE 50 MG/1
TABLET, FILM COATED ORAL
Qty: 30 TABLET | Refills: 0 | Status: SHIPPED | OUTPATIENT
Start: 2022-08-02 | End: 2022-12-31

## 2022-08-02 NOTE — TELEPHONE ENCOUNTER
Medication filled 1 time as pt is due for a follow-up in clinic. Pharmacy has been notified to inform patient to call clinic and schedule appointment.     Prescription approved per [facility] Refill Protocol.  Jeanna Payne RN

## 2022-09-20 ENCOUNTER — ANCILLARY PROCEDURE (OUTPATIENT)
Dept: MAMMOGRAPHY | Facility: CLINIC | Age: 62
End: 2022-09-20
Attending: NURSE PRACTITIONER
Payer: COMMERCIAL

## 2022-09-20 DIAGNOSIS — Z12.31 VISIT FOR SCREENING MAMMOGRAM: ICD-10-CM

## 2022-09-20 PROCEDURE — 77063 BREAST TOMOSYNTHESIS BI: CPT | Mod: TC | Performed by: STUDENT IN AN ORGANIZED HEALTH CARE EDUCATION/TRAINING PROGRAM

## 2022-09-20 PROCEDURE — 77067 SCR MAMMO BI INCL CAD: CPT | Mod: TC | Performed by: STUDENT IN AN ORGANIZED HEALTH CARE EDUCATION/TRAINING PROGRAM

## 2022-10-10 ENCOUNTER — HEALTH MAINTENANCE LETTER (OUTPATIENT)
Age: 62
End: 2022-10-10

## 2022-12-31 ENCOUNTER — HOSPITAL ENCOUNTER (INPATIENT)
Facility: CLINIC | Age: 62
LOS: 3 days | Discharge: HOME | DRG: 603 | End: 2023-01-03
Attending: EMERGENCY MEDICINE | Admitting: HOSPITALIST
Payer: COMMERCIAL

## 2022-12-31 ENCOUNTER — OFFICE VISIT (OUTPATIENT)
Dept: URGENT CARE | Facility: URGENT CARE | Age: 62
End: 2022-12-31
Payer: COMMERCIAL

## 2022-12-31 VITALS
SYSTOLIC BLOOD PRESSURE: 145 MMHG | OXYGEN SATURATION: 98 % | DIASTOLIC BLOOD PRESSURE: 87 MMHG | HEART RATE: 84 BPM | TEMPERATURE: 99.4 F

## 2022-12-31 DIAGNOSIS — J02.0 STREP THROAT: ICD-10-CM

## 2022-12-31 DIAGNOSIS — A46 ERYSIPELAS: Primary | ICD-10-CM

## 2022-12-31 DIAGNOSIS — R07.0 THROAT PAIN: ICD-10-CM

## 2022-12-31 DIAGNOSIS — L03.211 FACIAL CELLULITIS: Primary | ICD-10-CM

## 2022-12-31 DIAGNOSIS — A46 ERYSIPELAS: ICD-10-CM

## 2022-12-31 LAB
ANION GAP SERPL CALCULATED.3IONS-SCNC: 6 MMOL/L (ref 3–14)
BASOPHILS # BLD AUTO: 0.1 10E3/UL (ref 0–0.2)
BASOPHILS NFR BLD AUTO: 0 %
BUN SERPL-MCNC: 17 MG/DL (ref 7–30)
CALCIUM SERPL-MCNC: 9.1 MG/DL (ref 8.5–10.1)
CHLORIDE BLD-SCNC: 98 MMOL/L (ref 94–109)
CO2 SERPL-SCNC: 27 MMOL/L (ref 20–32)
CREAT SERPL-MCNC: 0.78 MG/DL (ref 0.52–1.04)
DEPRECATED S PYO AG THROAT QL EIA: POSITIVE
EOSINOPHIL # BLD AUTO: 0 10E3/UL (ref 0–0.7)
EOSINOPHIL NFR BLD AUTO: 0 %
ERYTHROCYTE [DISTWIDTH] IN BLOOD BY AUTOMATED COUNT: 11.9 % (ref 10–15)
FLUAV RNA SPEC QL NAA+PROBE: NEGATIVE
FLUBV RNA RESP QL NAA+PROBE: NEGATIVE
GFR SERPL CREATININE-BSD FRML MDRD: 85 ML/MIN/1.73M2
GLUCOSE BLD-MCNC: 112 MG/DL (ref 70–99)
HCT VFR BLD AUTO: 45.5 % (ref 35–47)
HGB BLD-MCNC: 15.2 G/DL (ref 11.7–15.7)
IMM GRANULOCYTES # BLD: 0.1 10E3/UL
IMM GRANULOCYTES NFR BLD: 1 %
LACTATE SERPL-SCNC: 0.8 MMOL/L (ref 0.7–2)
LYMPHOCYTES # BLD AUTO: 1 10E3/UL (ref 0.8–5.3)
LYMPHOCYTES NFR BLD AUTO: 5 %
MCH RBC QN AUTO: 29.9 PG (ref 26.5–33)
MCHC RBC AUTO-ENTMCNC: 33.4 G/DL (ref 31.5–36.5)
MCV RBC AUTO: 90 FL (ref 78–100)
MONOCYTES # BLD AUTO: 0.6 10E3/UL (ref 0–1.3)
MONOCYTES NFR BLD AUTO: 3 %
MRSA DNA SPEC QL NAA+PROBE: NEGATIVE
NEUTROPHILS # BLD AUTO: 18.9 10E3/UL (ref 1.6–8.3)
NEUTROPHILS NFR BLD AUTO: 91 %
NRBC # BLD AUTO: 0 10E3/UL
NRBC BLD AUTO-RTO: 0 /100
PLATELET # BLD AUTO: 330 10E3/UL (ref 150–450)
POTASSIUM BLD-SCNC: 4.1 MMOL/L (ref 3.4–5.3)
RBC # BLD AUTO: 5.08 10E6/UL (ref 3.8–5.2)
RSV RNA SPEC NAA+PROBE: NEGATIVE
SA TARGET DNA: POSITIVE
SARS-COV-2 RNA RESP QL NAA+PROBE: NEGATIVE
SODIUM SERPL-SCNC: 131 MMOL/L (ref 133–144)
WBC # BLD AUTO: 20.7 10E3/UL (ref 4–11)

## 2022-12-31 PROCEDURE — 36415 COLL VENOUS BLD VENIPUNCTURE: CPT | Performed by: EMERGENCY MEDICINE

## 2022-12-31 PROCEDURE — C9803 HOPD COVID-19 SPEC COLLECT: HCPCS

## 2022-12-31 PROCEDURE — 96372 THER/PROPH/DIAG INJ SC/IM: CPT

## 2022-12-31 PROCEDURE — 258N000003 HC RX IP 258 OP 636: Performed by: HOSPITALIST

## 2022-12-31 PROCEDURE — 87637 SARSCOV2&INF A&B&RSV AMP PRB: CPT | Performed by: EMERGENCY MEDICINE

## 2022-12-31 PROCEDURE — 99285 EMERGENCY DEPT VISIT HI MDM: CPT

## 2022-12-31 PROCEDURE — 120N000001 HC R&B MED SURG/OB

## 2022-12-31 PROCEDURE — G0378 HOSPITAL OBSERVATION PER HR: HCPCS

## 2022-12-31 PROCEDURE — 87040 BLOOD CULTURE FOR BACTERIA: CPT | Performed by: HOSPITALIST

## 2022-12-31 PROCEDURE — 96366 THER/PROPH/DIAG IV INF ADDON: CPT

## 2022-12-31 PROCEDURE — 250N000011 HC RX IP 250 OP 636: Performed by: HOSPITALIST

## 2022-12-31 PROCEDURE — 250N000011 HC RX IP 250 OP 636: Performed by: EMERGENCY MEDICINE

## 2022-12-31 PROCEDURE — 36415 COLL VENOUS BLD VENIPUNCTURE: CPT | Performed by: HOSPITALIST

## 2022-12-31 PROCEDURE — 87880 STREP A ASSAY W/OPTIC: CPT | Performed by: FAMILY MEDICINE

## 2022-12-31 PROCEDURE — 96367 TX/PROPH/DG ADDL SEQ IV INF: CPT

## 2022-12-31 PROCEDURE — 82310 ASSAY OF CALCIUM: CPT | Performed by: EMERGENCY MEDICINE

## 2022-12-31 PROCEDURE — 99223 1ST HOSP IP/OBS HIGH 75: CPT | Mod: AI | Performed by: HOSPITALIST

## 2022-12-31 PROCEDURE — 85025 COMPLETE CBC W/AUTO DIFF WBC: CPT | Performed by: EMERGENCY MEDICINE

## 2022-12-31 PROCEDURE — 83605 ASSAY OF LACTIC ACID: CPT | Performed by: EMERGENCY MEDICINE

## 2022-12-31 PROCEDURE — 99213 OFFICE O/P EST LOW 20 MIN: CPT | Performed by: FAMILY MEDICINE

## 2022-12-31 PROCEDURE — 96361 HYDRATE IV INFUSION ADD-ON: CPT

## 2022-12-31 PROCEDURE — 96365 THER/PROPH/DIAG IV INF INIT: CPT

## 2022-12-31 PROCEDURE — 87641 MR-STAPH DNA AMP PROBE: CPT | Performed by: EMERGENCY MEDICINE

## 2022-12-31 RX ORDER — CEFTRIAXONE 2 G/1
2 INJECTION, POWDER, FOR SOLUTION INTRAMUSCULAR; INTRAVENOUS ONCE
Status: COMPLETED | OUTPATIENT
Start: 2022-12-31 | End: 2022-12-31

## 2022-12-31 RX ORDER — ONDANSETRON 4 MG/1
4 TABLET, ORALLY DISINTEGRATING ORAL EVERY 6 HOURS PRN
Status: DISCONTINUED | OUTPATIENT
Start: 2022-12-31 | End: 2023-01-03 | Stop reason: HOSPADM

## 2022-12-31 RX ORDER — ONDANSETRON 2 MG/ML
4 INJECTION INTRAMUSCULAR; INTRAVENOUS EVERY 6 HOURS PRN
Status: DISCONTINUED | OUTPATIENT
Start: 2022-12-31 | End: 2023-01-03 | Stop reason: HOSPADM

## 2022-12-31 RX ORDER — CEFTRIAXONE 1 G/1
1 INJECTION, POWDER, FOR SOLUTION INTRAMUSCULAR; INTRAVENOUS EVERY 24 HOURS
Status: DISCONTINUED | OUTPATIENT
Start: 2023-01-01 | End: 2023-01-02

## 2022-12-31 RX ORDER — CLINDAMYCIN HCL 300 MG
300 CAPSULE ORAL 4 TIMES DAILY
Qty: 40 CAPSULE | Refills: 0 | Status: ON HOLD | OUTPATIENT
Start: 2022-12-31 | End: 2023-01-03

## 2022-12-31 RX ORDER — HYDROXYZINE HYDROCHLORIDE 25 MG/1
25 TABLET, FILM COATED ORAL AT BEDTIME
COMMUNITY
End: 2023-12-28

## 2022-12-31 RX ORDER — AMOXICILLIN 250 MG
1 CAPSULE ORAL 2 TIMES DAILY PRN
Status: DISCONTINUED | OUTPATIENT
Start: 2022-12-31 | End: 2023-01-03 | Stop reason: HOSPADM

## 2022-12-31 RX ORDER — LIDOCAINE 40 MG/G
CREAM TOPICAL
Status: DISCONTINUED | OUTPATIENT
Start: 2022-12-31 | End: 2023-01-03 | Stop reason: HOSPADM

## 2022-12-31 RX ORDER — VANCOMYCIN HYDROCHLORIDE 1 G/200ML
1000 INJECTION, SOLUTION INTRAVENOUS EVERY 12 HOURS
Status: DISCONTINUED | OUTPATIENT
Start: 2023-01-01 | End: 2023-01-02

## 2022-12-31 RX ORDER — ENOXAPARIN SODIUM 100 MG/ML
40 INJECTION SUBCUTANEOUS EVERY 24 HOURS
Status: DISCONTINUED | OUTPATIENT
Start: 2022-12-31 | End: 2023-01-03 | Stop reason: HOSPADM

## 2022-12-31 RX ORDER — ACETAMINOPHEN 325 MG/1
650 TABLET ORAL EVERY 6 HOURS PRN
Status: DISCONTINUED | OUTPATIENT
Start: 2022-12-31 | End: 2023-01-03 | Stop reason: HOSPADM

## 2022-12-31 RX ORDER — ACETAMINOPHEN 650 MG/1
650 SUPPOSITORY RECTAL EVERY 6 HOURS PRN
Status: DISCONTINUED | OUTPATIENT
Start: 2022-12-31 | End: 2023-01-03 | Stop reason: HOSPADM

## 2022-12-31 RX ORDER — AMOXICILLIN 250 MG
2 CAPSULE ORAL 2 TIMES DAILY PRN
Status: DISCONTINUED | OUTPATIENT
Start: 2022-12-31 | End: 2023-01-03 | Stop reason: HOSPADM

## 2022-12-31 RX ORDER — SODIUM CHLORIDE 9 MG/ML
INJECTION, SOLUTION INTRAVENOUS CONTINUOUS
Status: DISCONTINUED | OUTPATIENT
Start: 2022-12-31 | End: 2023-01-02

## 2022-12-31 RX ORDER — HYDRALAZINE HYDROCHLORIDE 20 MG/ML
10 INJECTION INTRAMUSCULAR; INTRAVENOUS EVERY 4 HOURS PRN
Status: DISCONTINUED | OUTPATIENT
Start: 2022-12-31 | End: 2023-01-03 | Stop reason: HOSPADM

## 2022-12-31 RX ADMIN — VANCOMYCIN HYDROCHLORIDE 2000 MG: 10 INJECTION, POWDER, LYOPHILIZED, FOR SOLUTION INTRAVENOUS at 17:56

## 2022-12-31 RX ADMIN — ENOXAPARIN SODIUM 40 MG: 40 INJECTION SUBCUTANEOUS at 17:56

## 2022-12-31 RX ADMIN — SODIUM CHLORIDE: 9 INJECTION, SOLUTION INTRAVENOUS at 17:57

## 2022-12-31 RX ADMIN — CEFTRIAXONE SODIUM 2 G: 2 INJECTION, POWDER, FOR SOLUTION INTRAMUSCULAR; INTRAVENOUS at 16:29

## 2022-12-31 ASSESSMENT — ACTIVITIES OF DAILY LIVING (ADL)
ADLS_ACUITY_SCORE: 35

## 2022-12-31 ASSESSMENT — ENCOUNTER SYMPTOMS
SINUS PAIN: 0
COLOR CHANGE: 1
SORE THROAT: 1
FACIAL SWELLING: 1
COUGH: 1
MYALGIAS: 1
RHINORRHEA: 1
ARTHRALGIAS: 1
EYE PAIN: 0

## 2022-12-31 NOTE — PROGRESS NOTES
SUBJECTIVE: Katie Garcia is a 62 year old female presenting with a chief complaint of sore throat and redness left cheek.  Onset of symptoms was day(s) ago.    Past Medical History:   Diagnosis Date     Hyperlipidemia      Mild major depression (H)      Allergies   Allergen Reactions     Pcn [Penicillins] Hives     Prozac [Fluoxetine Hcl] Hives     Social History     Tobacco Use     Smoking status: Never     Smokeless tobacco: Never   Substance Use Topics     Alcohol use: No       ROS:  GI: no vomiting    OBJECTIVE:  BP (!) 145/87 (BP Location: Right arm, Patient Position: Sitting, Cuff Size: Adult Regular)   Pulse 84   Temp 99.4  F (37.4  C) (Tympanic)   SpO2 98% GENERAL APPEARANCE: healthy, alert and no distress  EYES: EOMI,  PERRL, conjunctiva clear  HENT: TM's normal bilaterally and tonsillar erythema  NECK: supple, nontender, no lymphadenopathy  SKIN: red warm patch left cheek      ICD-10-CM    1. Erysipelas  A46 clindamycin (CLEOCIN) 300 MG capsule      2. Throat pain  R07.0 Streptococcus A Rapid Screen w/Reflex to PCR      3. Strep throat  J02.0 clindamycin (CLEOCIN) 300 MG capsule        Fluids/Rest, f/u if worse/not any better

## 2022-12-31 NOTE — H&P
"[facility]  History and Physical   Hospitalist  Miguel Frances MD       Katie Garcia MRN# [id number]   YOB: 1960 Age: 62 year old      Date of Admission:  12/31/2022         Assessment and Plan:   Katie Garcia is a 62 year old female with PMH significant for hypertension, dyslipidemia, depression who presented to ED with worsening left facial cellulitis.    Left facial cellulitis  sore throat with rapid strep a positive  -will admit as inpatient  -currently afebrile but marked leukocytosis with WBC 20.7; rapid strep test done at urgent care was positive  -has only got one dose of oral clindamycin and cellulitis worsening  -ordered for Rocephin and vancomycin in ED, will continue  -will obtain blood cultures (communicated to ED provider to order)  -monitor for fever curve, repeat CBC    Mild hyponatremia  -likely secondary to poor PO intake; sodium 131  -will start NS at 100 ML per hour  -monitor BMP with hydration    Hypertension  Dyslipidemia  -will resume PTA lisinopril and simvastatin when verified by pharmacy  -hydralazine IV PRN for SBP>180    Depression  -resume PTA trazodone when verified by pharmacy      Clinically Significant Risk Factors Present on Admission         # Hyponatremia: Lowest Na = 131 mmol/L in last 2 days, will monitor as appropriate          # Hypertension: home medication list includes antihypertensive(s)      # Obesity: Estimated body mass index is 30.82 kg/m  as calculated from the following:    Height as of this encounter: 1.651 m (5' 5\").    Weight as of this encounter: 84 kg (185 lb 3 oz).           # DVT prophylaxis: Lovenox  # Code status: full code    Care plan discussed with ED provider , patient and her  present in room           Primary Care Physician:   Enoc Huizar [phone]         Chief Complaint:     Facial cellulitis    History is obtained from the patient         History of Present Illness:     Katie MAYO" Jose is a 62 year old female with PMH significant for hypertension, dyslipidemia, depression who presented to ED with worsening left facial cellulitis. She states he woke up this morning with some feeling of left facial swelling. She noted some redness in her face. Went to the urgent care, rapid strep test was negative. She was prescribed clindamycin. She took one dose but noted that her symptoms were worsening and thus presented to ED for further evaluation. She denies any fever or itching. She was seen by Dr. Dailey. She was started on Rocephin and vancomycin and hospitalist was requested admission for further evaluation.    The patient denies any fever, chills, rigors, chest pain or shortness of breath.  Denies pain abdomen.  No bowel or bladder disturbances.           Past Medical History:     Hypertension  Dyslipidemia  Depression  morbid obesity          Past Surgical History:     Past Surgical History:   Procedure Laterality Date     NO HISTORY OF SURGERY                Home Medications:     Prior to Admission Medications   Prescriptions Last Dose Informant Patient Reported? Taking?   clindamycin (CLEOCIN) 300 MG capsule   No No   Sig: Take 1 capsule (300 mg) by mouth 4 times daily for 10 days   lisinopril (ZESTRIL) 10 MG tablet   No No   Sig: Take 1 tablet (10 mg) by mouth daily   simvastatin (ZOCOR) 10 MG tablet   No No   Sig: TAKE 1 TABLET BY MOUTH EVERYDAY AT BEDTIME   traZODone (DESYREL) 50 MG tablet   No No   Sig: TAKE 1 TABLET BY MOUTH AT BEDTIME      Facility-Administered Medications: None            Allergies:     Allergies   Allergen Reactions     Fluoxetine Hives     Pcn [Penicillins] Hives     Prozac [Fluoxetine Hcl] Hives            Social History:   Katie Garcia  reports that she has never smoked. She has never used smokeless tobacco. She reports that she does not drink alcohol and does not use drugs.              Family History:   Katie Garcia family history includes Family  "History Negative in her father; Hypertension in her mother; Psychotic Disorder in her sister.    Family history was reviewed by myself and not pertinent to current presentation.           Review of Systems:   A10 point Review of Systems was done and were negative other than noted in the HPI.             Physical Exam:   Blood pressure (!) 164/82, pulse 90, temperature 98  F (36.7  C), temperature source Temporal, resp. rate 20, height 1.651 m (5' 5\"), weight 84 kg (185 lb 3 oz), SpO2 98 %, not currently breastfeeding.  185 lbs 2.98 oz        Constitutional: Alert, awake and oriented X 3; lying comfortably in bed in no apparent distress   HEENT: Pupils equal and reactive to light and accomodation, EOMI intact; neck supple no raised JVD or rigidity; noted left face erythema, local warmth and mild tenderness; no cervical lymph nodes noted    Oral cavity: Moist mucosa   Cardiovascular: Normal s1 s2, regular rate and rhythm, no murmur   Lungs: B/l clear to auscultation, no wheezes or crepitations   Abdomen: Soft, nt, nd, no guarding, rigidity or rebound; BS +   LE : No edema   Musculoskeletal: Power 5/5 in all extremities   Neuro: No focal neurological deficits noted, CN II to XII grossly intact   Psychiatry: normal mood and affect  Skin: No obvious skin rashes or ulcers             Data:   All new lab and imaging data was reviewed in Epic.   Significant labs and imagings include:    BMP notable for sodium 131  CBC with WC 20.7 otherwise unremarkable  rapid strep test from urgent care was positive  MRI said nasal swab PCR pending  influenza, COVID and RSV negative             Miguel Frances MD  Hospitalist  "

## 2022-12-31 NOTE — ED PROVIDER NOTES
History     Chief Complaint:  Facial Swelling       The history is provided by the patient.      Katie Garcia is a 62 year old female who presents with facial swelling. She reports swelling since she woke up at 0700 this morning. The patient notes feeling swelling next to her left eye, which then spread to her cheek and towards the ear. She notes redness and the area was itchy and painful earlier, but cold presses have improved this. She took allergy medication, which helped briefly, but then symptoms returned. The patient went to urgent care and was given clindamycin, but the swelling became worse again. The patient has taken 1 dose of the clindamycin so far. The patient does not know of any exposures to the face, aside form if her dog got into something. She denies pain behind her eyes, dental pain, and sinus pain. Her temperature was 99F at urgent care today. She reports recent body aches, cough, rhinorrhea, and sore throat for 4 days. She has been around her grandchildren, who have been sick with influenza, strep, pink eye, and ear infections. The patient tested positive for strep at urgent care today. The patient also mentions intermittent hip pain for two weeks. She denies history of diabetes mellitus.     Independent Historian: yes     Review of External Notes: urgent care note (Rapid Strep: positive, prescribed clindamycin)     ROS:  Review of Systems   HENT: Positive for facial swelling, rhinorrhea and sore throat. Negative for dental problem and sinus pain.    Eyes: Negative for pain.   Respiratory: Positive for cough.    Musculoskeletal: Positive for arthralgias and myalgias.   Skin: Positive for color change.   All other systems reviewed and are negative.    Allergies:  Fluoxetine  Penicillins    Medications:    Clindamycin  Lisinopril  Simvastatin   Desyrel     Past Medical History:    Hyperlipidemia  Depression     Family History:    Hypertension  Psychiatric disorder     Social History:  The  "patient presents with her    She was seen at urgent care today   Works as a part time musician, organist   PCP: Enoc Huizar     Physical Exam     Patient Vitals for the past 24 hrs:   BP Temp Temp src Pulse Resp SpO2 Height Weight   01/01/23 0746 (!) 150/79 98  F (36.7  C) Oral 71 16 95 % -- --   01/01/23 0328 -- 98  F (36.7  C) -- -- -- -- -- --   01/01/23 0030 (!) 146/72 99.8  F (37.7  C) Oral 90 17 97 % -- --   01/01/23 0029 (!) 141/74 -- -- -- 16 95 % -- --   01/01/23 0000 (!) 142/77 -- -- 86 18 98 % -- --   12/31/22 2300 128/70 98.6  F (37  C) Oral -- 20 98 % -- --   12/31/22 2200 139/74 -- -- 98 20 96 % -- --   12/31/22 2100 139/74 -- -- 76 18 100 % -- --   12/31/22 2008 (!) 147/73 98.8  F (37.1  C) Oral 92 20 99 % -- --   12/31/22 1827 -- -- -- -- -- 98 % -- --   12/31/22 1826 -- -- -- -- -- 99 % -- --   12/31/22 1800 -- -- -- -- -- 97 % -- --   12/31/22 1615 (!) 164/82 -- -- 90 -- 98 % 1.651 m (5' 5\") 84 kg (185 lb 3 oz)   12/31/22 1600 (!) 154/91 -- -- 88 -- 99 % -- --   12/31/22 1550 (!) 183/71 98  F (36.7  C) Temporal 93 20 98 % -- --        Physical Exam  GENERAL: well developed, pleasant  HEAD: atraumatic  EYES: pupils reactive, extraocular muscles intact, conjunctivae normal  ENT:  mucus membranes moist  NECK:  trachea midline, normal range of motion  RESPIRATORY: no tachypnea, breath sounds clear to auscultation   CVS: normal S1/S2, no murmurs, intact distal pulses  ABDOMEN: soft, nontender, nondistention  MUSCULOSKELETAL: no deformities  SKIN: warm, erythema, sharp demarcation on the left face is noted  NEURO: GCS 15, cranial nerves intact, alert and oriented x3  PSYCH:  Mood/affect normal          Emergency Department Course     Laboratory:  Labs Ordered and Resulted from Time of ED Arrival to Time of ED Departure   BASIC METABOLIC PANEL - Abnormal       Result Value    Sodium 131 (*)     Potassium 4.1      Chloride 98      Carbon Dioxide (CO2) 27      Anion Gap 6      Urea Nitrogen " 17      Creatinine 0.78      Calcium 9.1      Glucose 112 (*)     GFR Estimate 85     CBC WITH PLATELETS AND DIFFERENTIAL - Abnormal    WBC Count 20.7 (*)     RBC Count 5.08      Hemoglobin 15.2      Hematocrit 45.5      MCV 90      MCH 29.9      MCHC 33.4      RDW 11.9      Platelet Count 330      % Neutrophils 91      % Lymphocytes 5      % Monocytes 3      % Eosinophils 0      % Basophils 0      % Immature Granulocytes 1      NRBCs per 100 WBC 0      Absolute Neutrophils 18.9 (*)     Absolute Lymphocytes 1.0      Absolute Monocytes 0.6      Absolute Eosinophils 0.0      Absolute Basophils 0.1      Absolute Immature Granulocytes 0.1      Absolute NRBCs 0.0     INFLUENZA A/B & SARS-COV2 PCR MULTIPLEX - Normal    Influenza A PCR Negative      Influenza B PCR Negative      RSV PCR Negative      SARS CoV2 PCR Negative     LACTIC ACID WHOLE BLOOD - Normal    Lactic Acid 0.8     MRSA MSSA PCR, NASAL SWAB    MRSA Target DNA Negative      SA Target DNA Positive     BLOOD CULTURE   BLOOD CULTURE        Emergency Department Course & Assessments:     Interventions:  Medications   hydrALAZINE (APRESOLINE) injection 10 mg (has no administration in time range)   cefTRIAXone (ROCEPHIN) 1 g vial to attach to  mL bag for ADULTS or NS 50 mL bag for PEDS (has no administration in time range)   lidocaine 1 % 0.1-1 mL (has no administration in time range)   lidocaine (LMX4) cream (has no administration in time range)   sodium chloride (PF) 0.9% PF flush 3 mL (3 mLs Intracatheter Not Given 1/1/23 0141)   sodium chloride (PF) 0.9% PF flush 3 mL (has no administration in time range)   melatonin tablet 1 mg (has no administration in time range)   enoxaparin ANTICOAGULANT (LOVENOX) injection 40 mg (40 mg Subcutaneous Given 12/31/22 7720)   sodium chloride 0.9% infusion ( Intravenous New Bag 1/1/23 0049)   acetaminophen (TYLENOL) tablet 650 mg (650 mg Oral Given 1/1/23 0328)     Or   acetaminophen (TYLENOL) Suppository 650 mg ( Rectal  See Alternative 1/1/23 0328)   senna-docusate (SENOKOT-S/PERICOLACE) 8.6-50 MG per tablet 1 tablet (has no administration in time range)     Or   senna-docusate (SENOKOT-S/PERICOLACE) 8.6-50 MG per tablet 2 tablet (has no administration in time range)   ondansetron (ZOFRAN ODT) ODT tab 4 mg (has no administration in time range)     Or   ondansetron (ZOFRAN) injection 4 mg (has no administration in time range)   vancomycin (VANCOCIN) 1000 mg in dextrose 5% 200 mL PREMIX (1,000 mg Intravenous New Bag 1/1/23 0507)   cefTRIAXone (ROCEPHIN) 2 g vial to attach to  ml bag for ADULTS or NS 50 ml bag for PEDS (0 g Intravenous Stopped 12/31/22 1654)   vancomycin (VANCOCIN) 2,000 mg in 0.9% NaCl 500 mL intermittent infusion (0 mg Intravenous Stopped 12/31/22 2016)      Independent Interpretation (X-rays, CTs, rhythm strip):  n/a    Consultations/Discussion of Management or Tests:  1618 I spoke with the ED pharmacist regarding antibiotics.   1645 Spoke with ED pharmacist again regarding plan.   1655 I consulted with Dr. Frances of the hospitalist team regarding the patient, who accepted the patient for admission.    ED Course as of 01/01/23 0906   Sat Dec 31, 2022   1605 I obtained history and examined the patient as noted above.    1651 I rechecked and updated the patient.     Disposition:  The patient was admitted to the hospital under the care of Dr. Frances.     Impression & Plan      Medical Decision Making:    Patient presents with recent URI symptoms and now facial redness and warmth that has a fairly sharp demarcation terms of sorting out cellulitis versus erysipelas.  Looks to be a little more consistent with erysipelas.  Given the rapid expansion and extent and leukocytosis patient be admitted.  Do not suspect necrotizing fasciitis.  MRSA panel is sent off.  She was given Rocephin and vancomycin.      Diagnosis:    ICD-10-CM    1. Erysipelas  A46          Scribe Disclosure:  I, Stacie Bruno, am serving as a  scribe at 4:05 PM on 12/31/2022 to document services personally performed by Kenneth Dailey MD based on my observations and the provider's statements to me.     12/31/2022   Kenneth Dailey MD Adams, Shaun L, MD  01/01/23 0907

## 2022-12-31 NOTE — ED TRIAGE NOTES
Left facial swelling and redness; seen at  dx with cellulitis and strep; pt comes to ED with worsening facial swelling and nausea     Triage Assessment     Row Name 12/31/22 6622       Triage Assessment (Adult)    Airway WDL WDL       Respiratory WDL    Respiratory WDL WDL       Skin Circulation/Temperature WDL    Skin Circulation/Temperature WDL WDL       Cardiac WDL    Cardiac WDL WDL       Peripheral/Neurovascular WDL    Peripheral Neurovascular WDL WDL       Cognitive/Neuro/Behavioral WDL    Cognitive/Neuro/Behavioral WDL WDL

## 2022-12-31 NOTE — PHARMACY-ADMISSION MEDICATION HISTORY
Pharmacy Medication History  Admission medication history interview status for the 12/31/2022  admission is complete. See EPIC admission navigator for prior to admission medications     Location of Interview: Patient room  Medication history sources: Patient and Surescripts    Significant changes made to the medication list:  Removed: Trazodone  Added: Hydroxyzine, Relief Factor    In the past week, patient estimated taking medication this percent of the time: greater than 90%    Additional medication history information:   none    Medication reconciliation completed by provider prior to medication history? No    Time spent in this activity: 15 min    Prior to Admission medications    Medication Sig Last Dose Taking? Auth Provider Long Term End Date   clindamycin (CLEOCIN) 300 MG capsule Take 1 capsule (300 mg) by mouth 4 times daily for 10 days 12/31/2022 Yes Mega Kothari, DO  1/10/23   hydrOXYzine (ATARAX) 25 MG tablet Take 25 mg by mouth At Bedtime 12/30/2022 Yes Unknown, Entered By History     lisinopril (ZESTRIL) 10 MG tablet Take 1 tablet (10 mg) by mouth daily 12/30/2022 Yes Enoc Huizar MD Yes    simvastatin (ZOCOR) 10 MG tablet TAKE 1 TABLET BY MOUTH EVERYDAY AT BEDTIME 12/30/2022 Yes Enoc Huizar MD Yes    UNABLE TO FIND Take 1 packet by mouth 2 times daily MEDICATION NAME: Relief Factor (each packet has 4 pills)  OTC product for inflammation 12/31/2022 Yes Unknown, Entered By History         The information provided in this note is only as accurate as the sources available at the time of update(s)

## 2023-01-01 LAB
ANION GAP SERPL CALCULATED.3IONS-SCNC: 5 MMOL/L (ref 3–14)
BASOPHILS # BLD AUTO: 0.1 10E3/UL (ref 0–0.2)
BASOPHILS NFR BLD AUTO: 0 %
BUN SERPL-MCNC: 11 MG/DL (ref 7–30)
CALCIUM SERPL-MCNC: 8.3 MG/DL (ref 8.5–10.1)
CHLORIDE BLD-SCNC: 104 MMOL/L (ref 94–109)
CO2 SERPL-SCNC: 28 MMOL/L (ref 20–32)
CREAT SERPL-MCNC: 0.78 MG/DL (ref 0.52–1.04)
CRP SERPL-MCNC: 78.7 MG/L (ref 0–8)
EOSINOPHIL # BLD AUTO: 0.1 10E3/UL (ref 0–0.7)
EOSINOPHIL NFR BLD AUTO: 0 %
ERYTHROCYTE [DISTWIDTH] IN BLOOD BY AUTOMATED COUNT: 12.2 % (ref 10–15)
GFR SERPL CREATININE-BSD FRML MDRD: 85 ML/MIN/1.73M2
GLUCOSE BLD-MCNC: 100 MG/DL (ref 70–99)
HCT VFR BLD AUTO: 39.1 % (ref 35–47)
HGB BLD-MCNC: 13.4 G/DL (ref 11.7–15.7)
IMM GRANULOCYTES # BLD: 0.1 10E3/UL
IMM GRANULOCYTES NFR BLD: 1 %
LYMPHOCYTES # BLD AUTO: 2.8 10E3/UL (ref 0.8–5.3)
LYMPHOCYTES NFR BLD AUTO: 16 %
MCH RBC QN AUTO: 29.7 PG (ref 26.5–33)
MCHC RBC AUTO-ENTMCNC: 34.3 G/DL (ref 31.5–36.5)
MCV RBC AUTO: 87 FL (ref 78–100)
MONOCYTES # BLD AUTO: 1 10E3/UL (ref 0–1.3)
MONOCYTES NFR BLD AUTO: 5 %
NEUTROPHILS # BLD AUTO: 13.8 10E3/UL (ref 1.6–8.3)
NEUTROPHILS NFR BLD AUTO: 78 %
NRBC # BLD AUTO: 0 10E3/UL
NRBC BLD AUTO-RTO: 0 /100
PLATELET # BLD AUTO: 277 10E3/UL (ref 150–450)
POTASSIUM BLD-SCNC: 3.6 MMOL/L (ref 3.4–5.3)
RBC # BLD AUTO: 4.51 10E6/UL (ref 3.8–5.2)
SODIUM SERPL-SCNC: 137 MMOL/L (ref 133–144)
WBC # BLD AUTO: 17.8 10E3/UL (ref 4–11)

## 2023-01-01 PROCEDURE — 86140 C-REACTIVE PROTEIN: CPT | Performed by: HOSPITALIST

## 2023-01-01 PROCEDURE — 250N000011 HC RX IP 250 OP 636: Performed by: HOSPITALIST

## 2023-01-01 PROCEDURE — 99232 SBSQ HOSP IP/OBS MODERATE 35: CPT | Performed by: HOSPITALIST

## 2023-01-01 PROCEDURE — 85025 COMPLETE CBC W/AUTO DIFF WBC: CPT | Performed by: HOSPITALIST

## 2023-01-01 PROCEDURE — 258N000003 HC RX IP 258 OP 636: Performed by: HOSPITALIST

## 2023-01-01 PROCEDURE — 250N000013 HC RX MED GY IP 250 OP 250 PS 637: Performed by: HOSPITALIST

## 2023-01-01 PROCEDURE — 96372 THER/PROPH/DIAG INJ SC/IM: CPT

## 2023-01-01 PROCEDURE — 120N000001 HC R&B MED SURG/OB

## 2023-01-01 PROCEDURE — 80048 BASIC METABOLIC PNL TOTAL CA: CPT | Performed by: HOSPITALIST

## 2023-01-01 PROCEDURE — G0378 HOSPITAL OBSERVATION PER HR: HCPCS

## 2023-01-01 PROCEDURE — 96361 HYDRATE IV INFUSION ADD-ON: CPT

## 2023-01-01 PROCEDURE — 96376 TX/PRO/DX INJ SAME DRUG ADON: CPT

## 2023-01-01 PROCEDURE — 36415 COLL VENOUS BLD VENIPUNCTURE: CPT | Performed by: HOSPITALIST

## 2023-01-01 RX ORDER — LISINOPRIL 10 MG/1
10 TABLET ORAL DAILY
Status: DISCONTINUED | OUTPATIENT
Start: 2023-01-01 | End: 2023-01-03 | Stop reason: HOSPADM

## 2023-01-01 RX ORDER — HYDROXYZINE HYDROCHLORIDE 25 MG/1
25 TABLET, FILM COATED ORAL AT BEDTIME
Status: DISCONTINUED | OUTPATIENT
Start: 2023-01-01 | End: 2023-01-03 | Stop reason: HOSPADM

## 2023-01-01 RX ORDER — SIMVASTATIN 10 MG
10 TABLET ORAL AT BEDTIME
Status: DISCONTINUED | OUTPATIENT
Start: 2023-01-01 | End: 2023-01-03 | Stop reason: HOSPADM

## 2023-01-01 RX ADMIN — ACETAMINOPHEN 650 MG: 325 TABLET, FILM COATED ORAL at 18:16

## 2023-01-01 RX ADMIN — SODIUM CHLORIDE: 9 INJECTION, SOLUTION INTRAVENOUS at 11:31

## 2023-01-01 RX ADMIN — SODIUM CHLORIDE: 9 INJECTION, SOLUTION INTRAVENOUS at 00:49

## 2023-01-01 RX ADMIN — VANCOMYCIN HYDROCHLORIDE 1000 MG: 1 INJECTION, SOLUTION INTRAVENOUS at 05:07

## 2023-01-01 RX ADMIN — ENOXAPARIN SODIUM 40 MG: 40 INJECTION SUBCUTANEOUS at 17:19

## 2023-01-01 RX ADMIN — CEFTRIAXONE SODIUM 1 G: 1 INJECTION, POWDER, FOR SOLUTION INTRAMUSCULAR; INTRAVENOUS at 16:17

## 2023-01-01 RX ADMIN — ACETAMINOPHEN 650 MG: 325 TABLET, FILM COATED ORAL at 03:28

## 2023-01-01 RX ADMIN — ACETAMINOPHEN 650 MG: 325 TABLET, FILM COATED ORAL at 09:32

## 2023-01-01 RX ADMIN — LISINOPRIL 10 MG: 10 TABLET ORAL at 17:20

## 2023-01-01 RX ADMIN — VANCOMYCIN HYDROCHLORIDE 1000 MG: 1 INJECTION, SOLUTION INTRAVENOUS at 18:05

## 2023-01-01 ASSESSMENT — ACTIVITIES OF DAILY LIVING (ADL)
ADLS_ACUITY_SCORE: 24
ADLS_ACUITY_SCORE: 35
ADLS_ACUITY_SCORE: 24

## 2023-01-01 NOTE — PHARMACY-VANCOMYCIN DOSING SERVICE
Pharmacy Vancomycin Initial Note  Date of Service 2022  Patient's  1960  62 year old, female    Indication: Skin and Soft Tissue Infection    Current estimated CrCl = Estimated Creatinine Clearance: 80 mL/min (based on SCr of 0.78 mg/dL).    Creatinine for last 3 days  2022:  4:28 PM Creatinine 0.78 mg/dL    Recent Vancomycin Level(s) for last 3 days  No results found for requested labs within last 72 hours.      Vancomycin IV Administrations (past 72 hours)                   vancomycin (VANCOCIN) 2,000 mg in 0.9% NaCl 500 mL intermittent infusion (mg) 2,000 mg New Bag 22 1753                Nephrotoxins and other renal medications (From now, onward)    None          Contrast Orders - past 72 hours (72h ago, onward)    None          [employer] Prediction of Planned Initial Vancomycin Regimen  Loading dose: 2000mg IV x1 in ED  Regimen: 1000 mg IV every 12 hours.  Start time: 23:28 on 2022  Exposure target: AUC24 (range)400-600 mg/L.hr   AUC24,ss: 517 mg/L.hr  Probability of AUC24 > 400: 76 %  Ctrough,ss: 16.6 mg/L  Probability of Ctrough,ss > 20: 34 %  Probability of nephrotoxicity (Lodise UNA ): 12 %    Plan:  1. Start vancomycin  1000 mg IV q12h.   2. Vancomycin monitoring method: AUC  3. Vancomycin therapeutic monitoring goal: 400-600 mg*h/L  4. Pharmacy will check vancomycin levels as appropriate in 1-3 Days.    5. Serum creatinine levels will be ordered daily for the first week of therapy and at least twice weekly for subsequent weeks.      Airam Becerril, [facility]

## 2023-01-01 NOTE — PROGRESS NOTES
"SPIRITUAL HEALTH SERVICES Progress Note  [facility]  Unit Oncology    Saw pt Katie MAYO Hayderisaakon per admissions request. Introduced myself and SHS, I assessed for spiritual health needs.      Patient/Family Understanding of Illness and Goals of Care - Pt spoke about how this skin infection came on suddenly and doctors were working on figuring out the cause through process of elimination.     Distress and Loss - Pt named complex family dynamics as a concern.      Meaning, Beliefs, and Spirituality - Pt's ioana is important to her. Pt stated, \"I led a Bible Study on Thursday nights.\" Pt requested prayer, which I provided. I affirmed pt's emotions and experience through reflective listening and compassionate support.      Plan of Care - I plan to ask the unit  to follow-up with pt.     SHS remain available.     MARIIA LiceaDiv  Chaplain Resident   [phone]   "

## 2023-01-01 NOTE — PLAN OF CARE
Goal Outcome Evaluation:    Plan of care discussed with patient bedside.     A/O x 4. VSS on RA.  C/O L face pain 3-4/10 controlled with PRN Tylenol and Ice packs.  L face/ear/neck just above jawline is reddened/edematous; pt notes L hearing is diminished r/t swelling. Up independently in the room.  Continent of bowel and bladder. Tolerating regular diet. Denies nausea, vomiting, or shortness of breath.  On IV antibiotics; IV fluids infusing. Discharge pending clinical improvement and antibiotic plan.

## 2023-01-01 NOTE — PROGRESS NOTES
RECEIVING UNIT ED HANDOFF REVIEW    ED Nurse Handoff Report was reviewed by: Charlie Hassan RN on December 31, 2022 at 11:09 PM

## 2023-01-01 NOTE — PLAN OF CARE
Goal Outcome Evaluation:       Shift Note: [id number]   VSS on RA ex HTN. Aox4. Pt denies nausea. Reports mild pain and discomfort in left side of face. Pt states pain and sensation  radiates to Left ear and left jaw at times. Cold therapy applied on left side of face. Denied pain meds at start of shift. During shift, noticed that  left facial swelling appears to have increased. Pt reporting increased pain. More cold therapy and PRN Tylenol given to pt. Skin is reddened, tender, and hot to the touch. Will continue to monitor pt skin. PIV infusing NS with int abx. Ind in room. Cont B/B, no bm during shift. Discharge pending.

## 2023-01-01 NOTE — PROGRESS NOTES
"[facility]    Medicine Progress Note - Hospitalist Service    Date of Admission:  12/31/2022    Assessment & Plan   Katie Garcia is a 62 year old female with PMH significant for hypertension, dyslipidemia, depression who presented to ED with worsening left facial cellulitis.     Left facial cellulitis  Sore throat with rapid strep a positive  - currently afebrile but marked leukocytosis with WBC 20.7; rapid strep test done at urgent care was positive  - has only got one dose of oral clindamycin and cellulitis worsening  - ordered for Rocephin and vancomycin in ED, will continue  - follow blood cultures (communicated to ED provider to order)  - monitor for fever curve, repeat CBC  - consider ID consult in AM if necessary     Mild hyponatremia - improved  - likely secondary to poor PO intake; sodium 131  - given NS ivf upon admission, now 137 as of 1/1  - monitor BMP with hydration     Hypertension  Dyslipidemia  - will resume PTA lisinopril and simvastatin when verified by pharmacy  - hydralazine IV PRN for SBP>180     Depression  Appears stable. No meds on PTA list.         Diet: Combination Diet Regular Diet Adult    DVT Prophylaxis: Enoxaparin (Lovenox) SQ  Alvarez Catheter: Not present  Lines: None     Cardiac Monitoring: None  Code Status: Full Code      Clinically Significant Risk Factors Present on Admission                  # Hypertension: home medication list includes antihypertensive(s)      # Obesity: Estimated body mass index is 30.82 kg/m  as calculated from the following:    Height as of this encounter: 1.651 m (5' 5\").    Weight as of this encounter: 84 kg (185 lb 3 oz).           Disposition Plan      Expected Discharge Date: 01/02/2023                  Daniel Smith MD  Hospitalist Service  [facility]  Securely message with [employer] (more info)  Text page via [employer] Paging/Directory "   ______________________________________________________________________    Interval History   Care assumed today.  Patient seen and examined this afternoon.  Ongoing redness and tenderness reported but no new vision changes, hearing changes, or eating /drinking /breathing /issues.    Physical Exam   Vital Signs: Temp: 98  F (36.7  C) Temp src: Oral BP: (!) 150/79 Pulse: 71   Resp: 16 SpO2: 95 % O2 Device: None (Room air)    Weight: 185 lbs 2.98 oz    Gen: NAD, pleasant  HEENT: EOMI, MMM, left face with erythema, tenderness, warmth, extending to ear, nose, temple, and nearly jawline  Resp: no crackles,  no wheezes, no increased work of resp  CV: S1S2 heard, reg rhythm, reg rate  Abdo: soft, nontender, nondistended, bowel sounds present  Ext: calves nontender, well perfused  Neuro: aa, conversant, moving ext normally, CN grossly intact, no facial asymmetry      Medical Decision Making       38 MINUTES SPENT BY ME on the date of service doing chart review, history, exam, documentation & further activities per the note.      Data     I have personally reviewed the following data over the past 24 hrs:    17.8 (H)  \   13.4   / 277     137 104 11 /  100 (H)   3.6 28 0.78 \       Procal: N/A CRP: N/A Lactic Acid: 0.8         Imaging results reviewed over the past 24 hrs:   No results found for this or any previous visit (from the past 24 hour(s)).

## 2023-01-02 LAB
CREAT SERPL-MCNC: 0.69 MG/DL (ref 0.52–1.04)
CRP SERPL-MCNC: 77.7 MG/L (ref 0–8)
ERYTHROCYTE [DISTWIDTH] IN BLOOD BY AUTOMATED COUNT: 12.2 % (ref 10–15)
GFR SERPL CREATININE-BSD FRML MDRD: >90 ML/MIN/1.73M2
HCT VFR BLD AUTO: 40.1 % (ref 35–47)
HGB BLD-MCNC: 13.6 G/DL (ref 11.7–15.7)
MCH RBC QN AUTO: 29.7 PG (ref 26.5–33)
MCHC RBC AUTO-ENTMCNC: 33.9 G/DL (ref 31.5–36.5)
MCV RBC AUTO: 88 FL (ref 78–100)
PLATELET # BLD AUTO: 290 10E3/UL (ref 150–450)
RBC # BLD AUTO: 4.58 10E6/UL (ref 3.8–5.2)
WBC # BLD AUTO: 14.4 10E3/UL (ref 4–11)

## 2023-01-02 PROCEDURE — 96372 THER/PROPH/DIAG INJ SC/IM: CPT

## 2023-01-02 PROCEDURE — 99222 1ST HOSP IP/OBS MODERATE 55: CPT | Performed by: INTERNAL MEDICINE

## 2023-01-02 PROCEDURE — 99232 SBSQ HOSP IP/OBS MODERATE 35: CPT | Performed by: HOSPITALIST

## 2023-01-02 PROCEDURE — 96376 TX/PRO/DX INJ SAME DRUG ADON: CPT

## 2023-01-02 PROCEDURE — G0378 HOSPITAL OBSERVATION PER HR: HCPCS

## 2023-01-02 PROCEDURE — 36415 COLL VENOUS BLD VENIPUNCTURE: CPT | Performed by: HOSPITALIST

## 2023-01-02 PROCEDURE — 250N000013 HC RX MED GY IP 250 OP 250 PS 637: Performed by: HOSPITALIST

## 2023-01-02 PROCEDURE — 85027 COMPLETE CBC AUTOMATED: CPT | Performed by: HOSPITALIST

## 2023-01-02 PROCEDURE — 250N000011 HC RX IP 250 OP 636: Performed by: HOSPITALIST

## 2023-01-02 PROCEDURE — 86140 C-REACTIVE PROTEIN: CPT | Performed by: HOSPITALIST

## 2023-01-02 PROCEDURE — 120N000001 HC R&B MED SURG/OB

## 2023-01-02 PROCEDURE — 82565 ASSAY OF CREATININE: CPT | Performed by: HOSPITALIST

## 2023-01-02 RX ORDER — CEFUROXIME AXETIL 250 MG/1
500 TABLET ORAL EVERY 12 HOURS SCHEDULED
Status: DISCONTINUED | OUTPATIENT
Start: 2023-01-02 | End: 2023-01-03 | Stop reason: HOSPADM

## 2023-01-02 RX ADMIN — CEFUROXIME AXETIL 500 MG: 250 TABLET ORAL at 13:57

## 2023-01-02 RX ADMIN — VANCOMYCIN HYDROCHLORIDE 1000 MG: 1 INJECTION, SOLUTION INTRAVENOUS at 04:56

## 2023-01-02 RX ADMIN — ENOXAPARIN SODIUM 40 MG: 40 INJECTION SUBCUTANEOUS at 17:11

## 2023-01-02 RX ADMIN — LISINOPRIL 10 MG: 10 TABLET ORAL at 08:59

## 2023-01-02 RX ADMIN — CEFUROXIME AXETIL 500 MG: 250 TABLET ORAL at 21:30

## 2023-01-02 RX ADMIN — HYDROXYZINE HYDROCHLORIDE 25 MG: 25 TABLET, FILM COATED ORAL at 21:30

## 2023-01-02 RX ADMIN — SIMVASTATIN 10 MG: 10 TABLET, FILM COATED ORAL at 21:30

## 2023-01-02 ASSESSMENT — ACTIVITIES OF DAILY LIVING (ADL)
ADLS_ACUITY_SCORE: 24

## 2023-01-02 NOTE — PLAN OF CARE
Goal Outcome Evaluation:       Shift Note: [id number]   VSS on RA ex HTN. Aox4. Pt denies nausea. Reports mild pain and discomfort in left side of face. Pt states pain and sensation  radiates to Left ear and left jaw at times. Cold therapy applied on left side of face. The left side of her face is itchy, tender and hot to the touch, swollen and hardened in some areas. PIV infusing NS with int abx. Regular diet. Cont b/b, no bm. Voids spontaneously. Discharge pending, possible ID consult in AM.

## 2023-01-02 NOTE — PLAN OF CARE
Goal Outcome Evaluation:                 Patient is alert and oriented x 4. pain on left ear and left jaw controlled with PRN tylenol and Ice pack. Left facial swelling  and tender to touch. Walked on hallway x 1. PIV infusing NS with intermittent abx.  Vital signs stable. B/B continent. Denies fever. Regular diet. Pt refused bedtime ZOCOR and ATARAX, had  from home meds.  Discharge pending.

## 2023-01-02 NOTE — PROGRESS NOTES
"    Medicine Progress Note - Hospitalist Service    Date of Admission:  12/31/2022    Assessment & Plan   Katie Garcia is a 62 year old female with PMH significant for hypertension, dyslipidemia, depression who presented to ED with worsening left facial cellulitis.     Left facial cellulitis  Sore throat with rapid strep a positive  - currently afebrile but marked leukocytosis with WBC 20.7; rapid strep test done at urgent care was positive  - has only got one dose of oral clindamycin and cellulitis worsening  - ordered for Rocephin and vancomycin in ED, will continue  - follow blood cultures (communicated to ED provider to order)  - monitor for fever curve, repeat CBC  - consult ID - rec ceftin 500 bid x 10 days  - improving 1/2 - watching overnight and planning towards home 1/3 with PO abx     Mild hyponatremia - improved  - likely secondary to poor PO intake; sodium 131  - given NS ivf upon admission, now 137 as of 1/1  - monitor BMP with hydration     Hypertension  Dyslipidemia  - will resume PTA lisinopril and simvastatin when verified by pharmacy  - hydralazine IV PRN for SBP>180     Depression  Appears stable. No meds on PTA list.          Diet: Combination Diet Regular Diet Adult    DVT Prophylaxis: Enoxaparin (Lovenox) SQ  Alvarez Catheter: Not present  Lines: None     Cardiac Monitoring: None  Code Status: Full Code      Clinically Significant Risk Factors                        # Obesity: Estimated body mass index is 30.82 kg/m  as calculated from the following:    Height as of this encounter: 1.651 m (5' 5\").    Weight as of this encounter: 84 kg (185 lb 3 oz)., PRESENT ON ADMISSION         Disposition Plan      Expected Discharge Date: 01/03/2023                  Daniel Smith MD  Hospitalist Service    Securely message with [facility] (more info)  Text page via [employer] Paging/Directory "   ______________________________________________________________________    Interval History   Seen and examined. Reports improved facial rash. No fevers or chills. No sob, eating/swallowing issues. No visual or auditory issues.      Physical Exam   Vital Signs: Temp: 98.5  F (36.9  C) Temp src: Oral BP: (!) 165/90 Pulse: 70   Resp: 16 SpO2: 94 % O2 Device: None (Room air)    Weight: 185 lbs 2.98 oz    Gen: NAD, pleasant  HEENT: EOMI, MMM, erythema and edema receding, no eye involvement  Resp: no crackles,  no wheezes, no increased work of resp  CV: S1S2 heard, reg rhythm, reg rate  Abdo: soft, nontender, nondistended, bowel sounds present  Ext: calves nontender, well perfused  Neuro: aa, conversant, moving ext normally, CN grossly intact, no facial asymmetry      Medical Decision Making       37 MINUTES SPENT BY ME on the date of service doing chart review, history, exam, documentation & further activities per the note.      Data     I have personally reviewed the following data over the past 24 hrs:    14.4 (H)  \   13.6   / 290     N/A N/A N/A /  N/A   N/A N/A 0.69 \       Procal: N/A CRP: 77.7 (H) Lactic Acid: N/A         Imaging results reviewed over the past 24 hrs:   No results found for this or any previous visit (from the past 24 hour(s)).

## 2023-01-02 NOTE — CONSULTS
ID consult dictated IMP1 62-year-old female with classic left facial erysipelas, no real doubt about the diagnosis and in fact positive strep in the throat, this illness always looks worse than it is nothing to suggest deeper infection or abscess and no sepsis and port negative blood culture    REC 1 DC Berryo, ceftriaxone while here but okay oral Ceftin 500 twice daily for another 10 days and disposition, generally relatively benign condition, will be slow to improve on antibiotics but very unlikely to worsen and unlikely any deeper infection, if discharged on oral antibiotics and has issues return

## 2023-01-02 NOTE — CONSULTS
Consult Date: 01/02/2023    INFECTIOUS DISEASE CONSULTATION    LOCATION:  Room 813, [facility]    REFERRING PHYSICIAN:  Dr. Daniel Smith.    IMPRESSION:     1.  A 62-year-old female, acute sore throat, strep positive by antigen study before antibiotics started, left facial erythema, classic erysipelas, no real doubt about diagnosis.  Nothing to suggest deeper infection, not really periorbital, no signs of abscess.  No sepsis.  Blood cultures negative. This is a streptococcal infection by definition.  2.  Positive for strep throat test.  Symptoms resolved.  3.  REMOTE PENICILLIN ALLERGY, TOLERATING CEPHALOSPORINS.    RECOMMENDATIONS:    1.  No real doubt about the diagnosis here.  This condition always looks worse than it is, nothing to suggest deep infection and blood cultures negative.  Ceftriaxone while here, can discontinue vancomycin.  2.  Okay disposition on oral Ceftin 500 b.i.d. 10-day course. Of note, this disease is often very slow to look better, but is relatively benign. Only if she develops new acute fever, chills, antibiotic side effects, etc., is further intervention or workup needed.    HISTORY OF PRESENT ILLNESS:  This 62-year-old female is seen in consultation.  She has a history of being quite healthy.  No major infection problems, and then on Saturday developed acute throat symptoms, was seen, had a positive strep antigen test done. Before she even started the clindamycin she was prescribed, she started noticing left facial swelling and redness along with some slight fever symptoms.  Never really had significant deep pain, it was primarily in the left face area rather than periorbital.  At presentation, was started on vancomycin and ceftriaxone and is clinically improved, although still quite red.  No imaging was done, but nothing to suggest deep infection.    PAST MEDICAL HISTORY:  Generally without major infection problems.  No significant other major chronic medical  problems.    ALLERGIES:  PENICILLIN WITH HIVES IN HER 20s OR POSSIBLY IN HER 30s, DOES NOT REMEMBER WHETHER SHE HAS GOTTEN CEPHALOSPORINS, WERE GIVEN NOW WITHOUT ALLERGIC REACTION.    SOCIAL AND FAMILY HISTORY:  Some exposure to strep and family members.  No significant other family history relevant.    REVIEW OF SYSTEMS:  No major fevers, chills or sweats.  No significant deep pain in the left face.    PHYSICAL EXAMINATION:    GENERAL:  The patient appears his stated age, does not look toxic or ill.  VITAL SIGNS:  Currently within normal limits including being afebrile.  HEENT:  Classic left facial erysipelas.  No fluctuance.  Nothing to suggest periorbital infection.  No intraoral abnormalities of note.  NECK:  Supple and nontender.  Minimal lymphadenopathy.  LUNGS:  Unremarkable.  ABDOMEN:  Soft, nontender.  EXTREMITIES:  No rash or skin lesions.    LABORATORY DATA:  Strep throat antigen study positive.  White blood cell count 20,700, now down to 14,000.  No high end imaging done.    Thank you very much for this consultation.  I will follow the patient with you.     Geovani Fonseca MD        D: 2023   T: 2023   MT: CHIDI    Name:     SHANICE HAWTHORNE  MRN:      [id number]        Account:      [id number]   :      1960           Consult Date: 2023     Document: [id number]

## 2023-01-03 VITALS
HEIGHT: 65 IN | OXYGEN SATURATION: 97 % | SYSTOLIC BLOOD PRESSURE: 158 MMHG | HEART RATE: 65 BPM | DIASTOLIC BLOOD PRESSURE: 92 MMHG | RESPIRATION RATE: 17 BRPM | WEIGHT: 185.19 LBS | BODY MASS INDEX: 30.85 KG/M2 | TEMPERATURE: 98 F

## 2023-01-03 LAB
CREAT SERPL-MCNC: 0.59 MG/DL (ref 0.52–1.04)
GFR SERPL CREATININE-BSD FRML MDRD: >90 ML/MIN/1.73M2
PLATELET # BLD AUTO: 300 10E3/UL (ref 150–450)

## 2023-01-03 PROCEDURE — 36415 COLL VENOUS BLD VENIPUNCTURE: CPT | Performed by: HOSPITALIST

## 2023-01-03 PROCEDURE — 85049 AUTOMATED PLATELET COUNT: CPT | Performed by: HOSPITALIST

## 2023-01-03 PROCEDURE — 82565 ASSAY OF CREATININE: CPT | Performed by: HOSPITALIST

## 2023-01-03 PROCEDURE — 99232 SBSQ HOSP IP/OBS MODERATE 35: CPT | Performed by: INTERNAL MEDICINE

## 2023-01-03 PROCEDURE — 250N000013 HC RX MED GY IP 250 OP 250 PS 637: Performed by: HOSPITALIST

## 2023-01-03 PROCEDURE — G0378 HOSPITAL OBSERVATION PER HR: HCPCS

## 2023-01-03 PROCEDURE — 99239 HOSP IP/OBS DSCHRG MGMT >30: CPT | Performed by: HOSPITALIST

## 2023-01-03 RX ORDER — CEFUROXIME AXETIL 500 MG/1
500 TABLET ORAL EVERY 12 HOURS
Qty: 18 TABLET | Refills: 0 | Status: SHIPPED | OUTPATIENT
Start: 2023-01-03 | End: 2023-01-12

## 2023-01-03 RX ADMIN — ACETAMINOPHEN 650 MG: 325 TABLET, FILM COATED ORAL at 00:44

## 2023-01-03 RX ADMIN — CEFUROXIME AXETIL 500 MG: 250 TABLET ORAL at 08:28

## 2023-01-03 RX ADMIN — LISINOPRIL 10 MG: 10 TABLET ORAL at 08:28

## 2023-01-03 ASSESSMENT — ACTIVITIES OF DAILY LIVING (ADL)
ADLS_ACUITY_SCORE: 24

## 2023-01-03 NOTE — PROGRESS NOTES
"SPIRITUAL HEALTH SERVICES Progress Note  [facility] [address]    Referral Source: Follow-up    Brief supportive visit with Katie at bedside. Katie reported, \"I'm going home today! It's a prayer answered,\" and named that her \"infection\" feels \"much better.\" Katie asked to pray a prayer of \"praise\" together. I offered a prayer, incorporating themes of our conversation.    Plan: SHS remains available.     Daniella Armas MDiv  Chaplain Resident  Pager: [phone]   "

## 2023-01-03 NOTE — PROGRESS NOTES
"[facility]  Infectious Disease Progress Note          Assessment and Plan:   IMPRESSION:     1.  A 62-year-old female, acute sore throat, strep positive by antigen study before antibiotics started, left facial erythema, classic erysipelas, no real doubt about diagnosis.  Nothing to suggest deeper infection, not really periorbital, no signs of abscess.  No sepsis.  Blood cultures negative. This is a streptococcal infection by definition.  2.  Positive for strep throat test.  Symptoms resolved.  3.  REMOTE PENICILLIN ALLERGY, TOLERATING CEPHALOSPORINS.     RECOMMENDATIONS:    1.  No real doubt about the diagnosis here.  This condition always looks worse than it is, nothing to suggest deep infection and blood cultures negative.  Ceftriaxone while here, can discontinue vancomycin.  2.  Okay disposition on oral Ceftin 500 b.i.d. 10-day course. Of note, this disease is often very slow to look better, but is relatively benign. Only if she develops new acute fever, chills, antibiotic side effects, etc., is further intervention or workup needed.        Interval History:   no new complaints and doing well; no cp, sob, n/v/d, or abd pain. BC neg no pain              Medications:       cefuroxime  500 mg Oral Q12H ANTONIO (08/20)     enoxaparin ANTICOAGULANT  40 mg Subcutaneous Q24H     hydrOXYzine  25 mg Oral At Bedtime     lisinopril  10 mg Oral Daily     simvastatin  10 mg Oral At Bedtime     sodium chloride (PF)  3 mL Intracatheter Q8H                  Physical Exam:   Blood pressure (!) 158/92, pulse 65, temperature 98  F (36.7  C), temperature source Oral, resp. rate 17, height 1.651 m (5' 5\"), weight 84 kg (185 lb 3 oz), SpO2 97 %, not currently breastfeeding.  Wt Readings from Last 2 Encounters:   12/31/22 84 kg (185 lb 3 oz)   05/20/21 79.4 kg (175 lb)     Vital Signs with Ranges  Temp:  [98  F (36.7  C)-98.5  F (36.9  C)] 98  F (36.7  C)  Pulse:  [65-71] 65  Resp:  [16-18] 17  BP: (146-168)/(80-92) " 158/92  SpO2:  [94 %-97 %] 97 %    Constitutional: Awake, alert, cooperative, no apparent distress   Lungs: Clear to auscultation bilaterally, no crackles or wheezing   Cardiovascular: Regular rate and rhythm, normal S1 and S2, and no murmur noted   Abdomen: Normal bowel sounds, soft, non-distended, non-tender   Skin: No rashes, no cyanosis, no edema   Other: Face better          Data:   All microbiology laboratory data reviewed.  Recent Labs   Lab Test 01/03/23  0629 01/02/23  0654 01/01/23  0704 12/31/22  1628   WBC  --  14.4* 17.8* 20.7*   HGB  --  13.6 13.4 15.2   HCT  --  40.1 39.1 45.5   MCV  --  88 87 90    290 277 330     Recent Labs   Lab Test 01/03/23  0629 01/02/23  0654 01/01/23  0704   CR 0.59 0.69 0.78     No lab results found.  No lab results found.    Invalid input(s): IZZY

## 2023-01-03 NOTE — PLAN OF CARE
Goal Outcome Evaluation:         Shift Note: 7210-9691   VSS on RA ex HTN. Aox4. Pt denies nausea. Reports mild pain and discomfort in left side of face. Pt states pain and sensation  radiates to Left ear, neck and left jaw at times. Cold therapy applied on left side of face. The left side of her face is tender and hot to the touch. PRN Tylenol given x1. PIV SL. Regular diet. Cont b/b, no bm during shift. Voids spontaneously. Possible discharge home on 1/03 with PO Abx .

## 2023-01-03 NOTE — PLAN OF CARE
A&Ox4. VSS on RA. Denies chest pain and SOB. Left facial redness improving. Denies pain at the site. Using ice packs. CMS intact. Tolerating reg diet. Voiding. Up independently in room.       Pt discharged to home at 1200, ride given by spouse. Discharge instructions reviewed with patient, questions answered. Sent all personal belongings and medications with patient including PTA medications.

## 2023-01-03 NOTE — DISCHARGE SUMMARY
[facility]  Hospitalist Discharge Summary      Date of Admission:  12/31/2022  Date of Discharge:  1/3/2023 12:36 PM  Discharging Provider: Daniel Smith MD  Discharge Service: Hospitalist Service    Discharge Diagnoses   Left facial cellulitis  Sore throat with rapid strep a positive  Mild hyponatremia  Hypertension  Dyslipidemia  Depression      Follow-ups Needed After Discharge   Follow-up Appointments     Follow-up and recommended labs and tests       PCP for hospitalization follow up              Unresulted Labs Ordered in the Past 30 Days of this Admission     Date and Time Order Name Status Description    12/31/2022  5:17 PM Blood Culture Peripheral Blood Preliminary     12/31/2022  5:17 PM Blood Culture Peripheral Blood Preliminary       These results will be followed up by IM    Discharge Disposition   Discharged to home  Condition at discharge: Stable      Hospital Course   Katie Garcia is a 62 year old female with PMH significant for hypertension, dyslipidemia, depression who presented to ED with worsening left facial cellulitis.     Left facial cellulitis  Sore throat with rapid strep a positive  - currently afebrile but marked leukocytosis with WBC 20.7; rapid strep test done at urgent care was positive  - has only got one dose of oral clindamycin and cellulitis worsening  - ordered for Rocephin and vancomycin in ED, will continue  - follow blood cultures (communicated to ED provider to order)  - monitor for fever curve, repeat CBC  - consult ID - rec ceftin 500 bid x 10 days - appreciate ID assistance  - improving 1/2 - watching overnight and planning towards home 1/3 with PO abx  - 1/3 great improvement. No new issues. Continue ceftin at discharge. We discussed reasons to return to care.     Mild hyponatremia - improved  - likely secondary to poor PO intake; sodium 131  - given NS ivf upon admission, now 137 as of 1/1     Hypertension  Dyslipidemia  - will resume  PTA lisinopril and simvastatin when verified by pharmacy  - hydralazine IV PRN for SBP>180     Depression  Appears stable. No meds on PTA list.        Consultations This Hospital Stay   PHARMACY TO DOSE VANCO  PHARMACY TO DOSE VANCO  INFECTIOUS DISEASES IP CONSULT    Code Status   Prior    Time Spent on this Encounter   I, Daniel Smith MD, personally saw the patient today and spent greater than 30 minutes discharging this patient.       Daniel Smith MD  [place] [id number] [facility]  [address] [place] [address], [address]  [facility] [address]  Phone: [phone]  ______________________________________________________________________    Physical Exam   Vital Signs: Temp: 98  F (36.7  C) Temp src: Oral BP: (!) 158/92 Pulse: 65   Resp: 17 SpO2: 97 % O2 Device: None (Room air)    Weight: 185 lbs 2.98 oz    Gen: NAD, pleasant  HEENT: EOMI, MMM, much rescinded area of left cheek erythema, no eye/ear involvement  Resp: no crackles,  no wheezes, no increased work of resp  CV: S1S2 heard, reg rhythm, reg rate  Abdo: soft, nontender, nondistended, bowel sounds present  Ext: calves nontender, well perfused  Neuro: aa, oriented appropriately, moving all ext, CN grossly intact, no facial asymmetry         Primary Care Physician   Enoc Huizar    Discharge Orders      Reason for your hospital stay    Facial skin and soft tissue infection     Follow-up and recommended labs and tests     PCP for hospitalization follow up     Activity    Your activity upon discharge: activity as tolerated     Discharge Instructions    Continue with antibiotics as below. Follow up with your regular doctor. Return to care urgently if you have worsening symptoms or new symptoms like fevers, chills, difficulty breathing, swallowing, or new vision change or headache.     Diet    Follow this diet upon discharge: Orders Placed This Encounter      Combination Diet Regular Diet Adult       Significant Results and Procedures   Most Recent 3  CBC's:Recent Labs   Lab Test 01/03/23  0629 01/02/23  0654 01/01/23  0704 12/31/22  1628   WBC  --  14.4* 17.8* 20.7*   HGB  --  13.6 13.4 15.2   MCV  --  88 87 90    290 277 330     Most Recent 3 BMP's:Recent Labs   Lab Test 01/03/23  0629 01/02/23  0654 01/01/23  0704 12/31/22  1628 12/31/22  1628 05/20/21  1501   NA  --   --  137  --  131* 138   POTASSIUM  --   --  3.6  --  4.1 4.7   CHLORIDE  --   --  104  --  98 104   CO2  --   --  28  --  27 33*   BUN  --   --  11  --  17 12   CR 0.59 0.69 0.78   < > 0.78 0.92   ANIONGAP  --   --  5  --  6 1*   NATALY  --   --  8.3*  --  9.1 9.0   GLC  --   --  100*  --  112* 85    < > = values in this interval not displayed.     Most Recent ESR & CRP:Recent Labs   Lab Test 01/02/23  0654   CRP 77.7*   ,   Results for orders placed or performed in visit on 09/20/22   MA Screen Bilateral w/Carlos    Narrative    BILATERAL FULL FIELD DIGITAL SCREENING MAMMOGRAM WITH TOMOSYNTHESIS    Performed on: 9/20/22    Compared to: 03/13/2019, 09/09/2015, and 01/19/2012    Technique:  This study was evaluated with the assistance of Computer-Aided   Detection.  Breast Tomosynthesis was used in interpretation.    Findings: The breasts have scattered areas of fibroglandular density.    There is no radiographic evidence of malignancy.     Impression    IMPRESSION: ACR BI-RADS Category 1: Negative    RECOMMENDED FOLLOW-UP: Annual routine screening mammogram    The results and recommendations of this examination will be communicated   to the patient.             Discharge Medications   Discharge Medication List as of 1/3/2023 11:14 AM      START taking these medications    Details   cefuroxime (CEFTIN) 500 MG tablet Take 1 tablet (500 mg) by mouth every 12 hours for 9 days, Disp-18 tablet, R-0, E-Prescribe         CONTINUE these medications which have NOT CHANGED    Details   hydrOXYzine (ATARAX) 25 MG tablet Take 25 mg by mouth At Bedtime, Historical      lisinopril (ZESTRIL) 10 MG tablet Take  1 tablet (10 mg) by mouth daily, Disp-90 tablet, R-3, E-PrescribeProfile Rx: patient will contact pharmacy when needed      simvastatin (ZOCOR) 10 MG tablet TAKE 1 TABLET BY MOUTH EVERYDAY AT BEDTIME, Disp-90 tablet, R-1, E-Prescribe      UNABLE TO FIND Take 1 packet by mouth 2 times daily MEDICATION NAME: Relief Factor (each packet has 4 pills)  OTC product for inflammation, Historical         STOP taking these medications       clindamycin (CLEOCIN) 300 MG capsule Comments:   Reason for Stopping:             Allergies   Allergies   Allergen Reactions     Fluoxetine Hives     Pcn [Penicillins] Hives     Prozac [Fluoxetine Hcl] Hives

## 2023-01-03 NOTE — PLAN OF CARE
Orientation/Cognitive: A/O x4     Observation Goals (Met/ Not Met): inpatient      Mobility Level/Assist Equipment: Independent       Fall Risk (Y/N): N      Behavior Concerns: Green      Pain Management: denies pain      Tele/VS/O2: VSS on RA      ABNL Lab/BG: CRP 77.7, WBC 14.4      Diet: Reg      Bowel/Bladder: Continent       Skin Concerns: none        Drains/Devices: PIV SL      Tests/Procedures for next shift: AM labs       Anticipated DC date & active delays: 1/3 ??     Patient Stated Goal for Today: Rest

## 2023-01-03 NOTE — PROGRESS NOTES
Pt A&O x4, up indep, VSS except BP slightly high. L face redness and swelling. Denied pain or sore throat, only a little bit itching. ID consult, changed IV abx to oral abx. Saline lock. Discharge pending.

## 2023-01-04 ENCOUNTER — TELEPHONE (OUTPATIENT)
Dept: INTERNAL MEDICINE | Facility: CLINIC | Age: 63
End: 2023-01-04

## 2023-01-04 NOTE — TELEPHONE ENCOUNTER
What type of discharge? Inpatient  Risk of Hospital admission or ED visit: 10%  Is a TCM episode required? No  When should the patient follow up with PCP? 14 days of discharge.    Leobardo Urias RN  [facility]

## 2023-01-05 ENCOUNTER — NURSE TRIAGE (OUTPATIENT)
Dept: NURSING | Facility: CLINIC | Age: 63
End: 2023-01-05

## 2023-01-05 LAB
BACTERIA BLD CULT: NO GROWTH
BACTERIA BLD CULT: NO GROWTH

## 2023-01-05 NOTE — TELEPHONE ENCOUNTER
Disregard    Documented on previous encounter re: hospital F/U    Pb Baugh RN, BSN  Triage Nurse Advisor

## 2023-01-05 NOTE — TELEPHONE ENCOUNTER
Call from patient returning call to nurse.    Patient says she will be going to a new clinic called [facility] on 1/12/23 for a follow-up.      Pb Baugh RN, BSN  Triage Nurse Advisor

## 2023-03-25 ENCOUNTER — HEALTH MAINTENANCE LETTER (OUTPATIENT)
Age: 63
End: 2023-03-25

## 2023-12-28 ENCOUNTER — VIRTUAL VISIT (OUTPATIENT)
Dept: FAMILY MEDICINE | Facility: CLINIC | Age: 63
End: 2023-12-28
Payer: COMMERCIAL

## 2023-12-28 DIAGNOSIS — I10 ESSENTIAL HYPERTENSION: Primary | ICD-10-CM

## 2023-12-28 DIAGNOSIS — E78.5 HYPERLIPIDEMIA LDL GOAL <160: ICD-10-CM

## 2023-12-28 DIAGNOSIS — G47.00 INSOMNIA, UNSPECIFIED TYPE: ICD-10-CM

## 2023-12-28 PROCEDURE — 99213 OFFICE O/P EST LOW 20 MIN: CPT | Mod: VID | Performed by: NURSE PRACTITIONER

## 2023-12-28 RX ORDER — SIMVASTATIN 10 MG
10 TABLET ORAL AT BEDTIME
Qty: 90 TABLET | Refills: 0 | Status: SHIPPED | OUTPATIENT
Start: 2023-12-28 | End: 2024-03-25

## 2023-12-28 RX ORDER — HYDROXYZINE HYDROCHLORIDE 25 MG/1
25 TABLET, FILM COATED ORAL AT BEDTIME
Qty: 90 TABLET | Refills: 0 | Status: SHIPPED | OUTPATIENT
Start: 2023-12-28 | End: 2024-03-25

## 2023-12-28 RX ORDER — LISINOPRIL 10 MG/1
10 TABLET ORAL DAILY
Qty: 90 TABLET | Refills: 0 | Status: SHIPPED | OUTPATIENT
Start: 2023-12-28 | End: 2024-08-01

## 2023-12-28 ASSESSMENT — PATIENT HEALTH QUESTIONNAIRE - PHQ9
10. IF YOU CHECKED OFF ANY PROBLEMS, HOW DIFFICULT HAVE THESE PROBLEMS MADE IT FOR YOU TO DO YOUR WORK, TAKE CARE OF THINGS AT HOME, OR GET ALONG WITH OTHER PEOPLE: NOT DIFFICULT AT ALL
SUM OF ALL RESPONSES TO PHQ QUESTIONS 1-9: 3
SUM OF ALL RESPONSES TO PHQ QUESTIONS 1-9: 3

## 2023-12-28 ASSESSMENT — ANXIETY QUESTIONNAIRES
3. WORRYING TOO MUCH ABOUT DIFFERENT THINGS: NOT AT ALL
5. BEING SO RESTLESS THAT IT IS HARD TO SIT STILL: NOT AT ALL
2. NOT BEING ABLE TO STOP OR CONTROL WORRYING: NOT AT ALL
6. BECOMING EASILY ANNOYED OR IRRITABLE: NOT AT ALL
GAD7 TOTAL SCORE: 0
IF YOU CHECKED OFF ANY PROBLEMS ON THIS QUESTIONNAIRE, HOW DIFFICULT HAVE THESE PROBLEMS MADE IT FOR YOU TO DO YOUR WORK, TAKE CARE OF THINGS AT HOME, OR GET ALONG WITH OTHER PEOPLE: NOT DIFFICULT AT ALL
GAD7 TOTAL SCORE: 0
4. TROUBLE RELAXING: NOT AT ALL
7. FEELING AFRAID AS IF SOMETHING AWFUL MIGHT HAPPEN: NOT AT ALL
1. FEELING NERVOUS, ANXIOUS, OR ON EDGE: NOT AT ALL

## 2023-12-28 NOTE — PROGRESS NOTES
"Katie is a 63 year old who is being evaluated via a billable video visit.      How would you like to obtain your AVS? MyChart  If the video visit is dropped, the invitation should be resent by: Text to cell phone: [phone]  Will anyone else be joining your video visit? No      Assessment & Plan     Katie was seen today for recheck medication.    Diagnoses and all orders for this visit:    Essential hypertension  Stable per patient, due for in clinic BP check and BMP.    -     lisinopril (ZESTRIL) 10 MG tablet; Take 1 tablet (10 mg) by mouth daily    Hyperlipidemia LDL goal <160  Recent Labs   Lab Test 05/20/21  1501 10/24/19  0918   CHOL 207* 191   HDL 67 61   * 112*   TRIG 94 90   Due for fasting lipid panel.   -     simvastatin (ZOCOR) 10 MG tablet; Take 1 tablet (10 mg) by mouth at bedtime    Insomnia, unspecified type  Stable with use of Hydroxyzine at bedtime.    -     hydrOXYzine HCl (ATARAX) 25 MG tablet; Take 1 tablet (25 mg) by mouth at bedtime     BMI:   Estimated body mass index is 30.82 kg/m  as calculated from the following:    Height as of 12/31/22: 1.651 m (5' 5\").    Weight as of 12/31/22: 84 kg (185 lb 3 oz).     Return in about 3 months (around 3/28/2024) for Preventative Visit + Med Check/Fasting labs.    Yue Teresa, TAMRA CNP  [facility]   Katie is a 63 year old, presenting for the following health issues:  No chief complaint on file.        12/28/2023    12:19 PM   Additional Questions   Roomed by Cherelle KAUFMAN       History of Present Illness       Reason for visit:  Refill prescriptions before my insurance changes.    She eats 2-3 servings of fruits and vegetables daily.She consumes 0 sweetened beverage(s) daily.She exercises with enough effort to increase her heart rate 20 to 29 minutes per day.  She exercises with enough effort to increase her heart rate 3 or less days per week. She is missing 1 dose(s) of medications per " week.  She is not taking prescribed medications regularly due to remembering to take.       Hyperlipidemia Follow-Up  Recent Labs   Lab Test 05/20/21  1501 10/24/19  0918   CHOL 207* 191   HDL 67 61   * 112*   TRIG 94 90     Are you regularly taking any medication or supplement to lower your cholesterol?   Yes- Simvastatin  Are you having muscle aches or other side effects that you think could be caused by your cholesterol lowering medication?  Yes- has arthritis     Hypertension Follow-up    Do you check your blood pressure regularly outside of the clinic? No   Are you following a low salt diet? No  Are your blood pressures ever more than 140 on the top number (systolic) OR more   than 90 on the bottom number (diastolic), for example 140/90? No    Medication Followup of Hydroxyzine  Taking Medication as prescribed: yes  Side Effects:  None  Medication Helping Symptoms:  yes  - helps her with sleep  Is using Hydroxyzine on a nightly basis for insomnia.          3/13/2019     4:25 PM 7/5/2019     1:00 PM 12/28/2023    10:03 AM   PHQ   PHQ-9 Total Score  11 3   Q9: Thoughts of better off dead/self-harm past 2 weeks Not at all Not at all Not at all          11/15/2017    11:43 AM 12/28/2023    12:15 PM   GUERA-7 SCORE   Total Score  0 (minimal anxiety)   Total Score 11 0     Previous care - [facility].  States that she had a recent visit with them.    Insurance is switching January 2024.      Review of Systems   Constitutional, HEENT, cardiovascular, pulmonary, gi and gu systems are negative, except as otherwise noted.      Objective           Vitals:  No vitals were obtained today due to virtual visit.    Physical Exam   GENERAL: Healthy, alert and no distress  EYES: Eyes grossly normal to inspection.  No discharge or erythema, or obvious scleral/conjunctival abnormalities.  RESP: No audible wheeze, cough, or visible cyanosis.  No visible retractions or increased work of breathing.    SKIN: Visible skin  clear. No significant rash, abnormal pigmentation or lesions.  NEURO: Cranial nerves grossly intact.  Mentation and speech appropriate for age.  PSYCH: Mentation appears normal, affect normal/bright, judgement and insight intact, normal speech and appearance well-groomed.          Video-Visit Details    Type of service:  Video Visit     Originating Location (pt. Location): Home  Distant Location (provider location):  Off-site  Platform used for Video Visit: Takepin

## 2024-03-25 DIAGNOSIS — G47.00 INSOMNIA, UNSPECIFIED TYPE: ICD-10-CM

## 2024-03-25 DIAGNOSIS — E78.5 HYPERLIPIDEMIA LDL GOAL <160: ICD-10-CM

## 2024-03-25 RX ORDER — HYDROXYZINE HYDROCHLORIDE 25 MG/1
25 TABLET, FILM COATED ORAL AT BEDTIME
Qty: 90 TABLET | Refills: 2 | Status: SHIPPED | OUTPATIENT
Start: 2024-03-25

## 2024-03-25 RX ORDER — SIMVASTATIN 10 MG
10 TABLET ORAL AT BEDTIME
Qty: 90 TABLET | Refills: 0 | Status: SHIPPED | OUTPATIENT
Start: 2024-03-25 | End: 2024-08-02

## 2024-05-26 ENCOUNTER — HEALTH MAINTENANCE LETTER (OUTPATIENT)
Age: 64
End: 2024-05-26

## 2024-07-29 SDOH — HEALTH STABILITY: PHYSICAL HEALTH: ON AVERAGE, HOW MANY DAYS PER WEEK DO YOU ENGAGE IN MODERATE TO STRENUOUS EXERCISE (LIKE A BRISK WALK)?: 3 DAYS

## 2024-07-29 SDOH — HEALTH STABILITY: PHYSICAL HEALTH: ON AVERAGE, HOW MANY MINUTES DO YOU ENGAGE IN EXERCISE AT THIS LEVEL?: 70 MIN

## 2024-07-29 ASSESSMENT — SOCIAL DETERMINANTS OF HEALTH (SDOH): HOW OFTEN DO YOU GET TOGETHER WITH FRIENDS OR RELATIVES?: ONCE A WEEK

## 2024-08-01 ENCOUNTER — OFFICE VISIT (OUTPATIENT)
Dept: FAMILY MEDICINE | Facility: CLINIC | Age: 64
End: 2024-08-01
Payer: COMMERCIAL

## 2024-08-01 VITALS
BODY MASS INDEX: 33.12 KG/M2 | DIASTOLIC BLOOD PRESSURE: 82 MMHG | WEIGHT: 194 LBS | HEART RATE: 68 BPM | RESPIRATION RATE: 22 BRPM | HEIGHT: 64 IN | TEMPERATURE: 97.8 F | OXYGEN SATURATION: 99 % | SYSTOLIC BLOOD PRESSURE: 127 MMHG

## 2024-08-01 DIAGNOSIS — I10 ESSENTIAL HYPERTENSION: ICD-10-CM

## 2024-08-01 DIAGNOSIS — E78.5 HYPERLIPIDEMIA LDL GOAL <160: ICD-10-CM

## 2024-08-01 DIAGNOSIS — F43.21 GRIEF: ICD-10-CM

## 2024-08-01 DIAGNOSIS — F33.0 MILD EPISODE OF RECURRENT MAJOR DEPRESSIVE DISORDER (H): ICD-10-CM

## 2024-08-01 DIAGNOSIS — Z12.31 ENCOUNTER FOR SCREENING MAMMOGRAM FOR BREAST CANCER: ICD-10-CM

## 2024-08-01 DIAGNOSIS — Z00.00 ROUTINE GENERAL MEDICAL EXAMINATION AT A HEALTH CARE FACILITY: Primary | ICD-10-CM

## 2024-08-01 LAB
ALBUMIN UR-MCNC: NEGATIVE MG/DL
APPEARANCE UR: CLEAR
BILIRUB UR QL STRIP: NEGATIVE
COLOR UR AUTO: YELLOW
GLUCOSE UR STRIP-MCNC: NEGATIVE MG/DL
HGB UR QL STRIP: NEGATIVE
KETONES UR STRIP-MCNC: NEGATIVE MG/DL
LEUKOCYTE ESTERASE UR QL STRIP: NEGATIVE
NITRATE UR QL: NEGATIVE
PH UR STRIP: 7 [PH] (ref 5–7)
SP GR UR STRIP: 1.01 (ref 1–1.03)
UROBILINOGEN UR STRIP-ACNC: 0.2 E.U./DL

## 2024-08-01 PROCEDURE — 80053 COMPREHEN METABOLIC PANEL: CPT

## 2024-08-01 PROCEDURE — 80061 LIPID PANEL: CPT

## 2024-08-01 PROCEDURE — 99396 PREV VISIT EST AGE 40-64: CPT

## 2024-08-01 PROCEDURE — 36415 COLL VENOUS BLD VENIPUNCTURE: CPT

## 2024-08-01 PROCEDURE — 81003 URINALYSIS AUTO W/O SCOPE: CPT

## 2024-08-01 PROCEDURE — 99214 OFFICE O/P EST MOD 30 MIN: CPT | Mod: 25

## 2024-08-01 RX ORDER — LISINOPRIL 10 MG/1
10 TABLET ORAL DAILY
Qty: 90 TABLET | Refills: 3 | Status: SHIPPED | OUTPATIENT
Start: 2024-08-01

## 2024-08-01 ASSESSMENT — PATIENT HEALTH QUESTIONNAIRE - PHQ9: SUM OF ALL RESPONSES TO PHQ QUESTIONS 1-9: 3

## 2024-08-01 NOTE — PATIENT INSTRUCTIONS
"Patient Education   Eating Healthy Foods: Care Instructions  With every meal, you can make healthy food choices. Try to eat a variety of fruits, vegetables, whole grains, lean proteins, and low-fat dairy products. This can help you get the right balance of nutrients, including vitamins and minerals. Small changes add up over time. You can start by adding one healthy food to your meals each day.    Try to make half your plate fruits and vegetables, one-fourth whole grains, and one-fourth lean proteins. Try including dairy with your meals.   Eat more fruits and vegetables. Try to have them with most meals and snacks.   Foods for healthy eating    Fruits    These can be fresh, frozen, canned, or dried.  Try to choose whole fruit rather than fruit juice.  Eat a variety of colors.    Vegetables    These can be fresh, frozen, canned, or dried.  Beans, peas, and lentils count too.    Whole grains    Choose whole-grain breads, cereals, and noodles.  Try brown rice.    Lean proteins    These can include lean meat, poultry, fish, and eggs.  You can also have tofu, beans, peas, lentils, nuts, and seeds.    Dairy    Try milk, yogurt, and cheese.  Choose low-fat or fat-free when you can.  If you need to, use lactose-free milk or fortified plant-based milk products, such as soy milk.    Water    Drink water when you're thirsty.  Limit sugar-sweetened drinks, including soda, fruit drinks, and sports drinks.  Where can you learn more?  Go to https://www.GoodGuide.net/patiented  Enter T756 in the search box to learn more about \"Eating Healthy Foods: Care Instructions.\"  Current as of: September 20, 2023               Content Version: 14.0    3472-0628 [employer].   Care instructions adapted under license by your healthcare professional. If you have questions about a medical condition or this instruction, always ask your healthcare professional. [employer] disclaims any warranty or liability for your use " of this information.      Preventive Care Advice   This is general advice given by our system to help you stay healthy. However, your care team may have specific advice just for you. Please talk to your care team about your preventive care needs.  Nutrition  Eat 5 or more servings of fruits and vegetables each day.  Try wheat bread, brown rice and whole grain pasta (instead of white bread, rice, and pasta).  Get enough calcium and vitamin D. Check the label on foods and aim for 100% of the RDA (recommended daily allowance).  Lifestyle  Exercise at least 150 minutes each week  (30 minutes a day, 5 days a week).  Do muscle strengthening activities 2 days a week. These help control your weight and prevent disease.  No smoking.  Wear sunscreen to prevent skin cancer.  Have a dental exam and cleaning every 6 months.  Yearly exams  See your health care team every year to talk about:  Any changes in your health.  Any medicines your care team has prescribed.  Preventive care, family planning, and ways to prevent chronic diseases.  Shots (vaccines)   HPV shots (up to age 26), if you've never had them before.  Hepatitis B shots (up to age 59), if you've never had them before.  COVID-19 shot: Get this shot when it's due.  Flu shot: Get a flu shot every year.  Tetanus shot: Get a tetanus shot every 10 years.  Pneumococcal, hepatitis A, and RSV shots: Ask your care team if you need these based on your risk.  Shingles shot (for age 50 and up)  General health tests  Diabetes screening:  Starting at age 35, Get screened for diabetes at least every 3 years.  If you are younger than age 35, ask your care team if you should be screened for diabetes.  Cholesterol test: At age 39, start having a cholesterol test every 5 years, or more often if advised.  Bone density scan (DEXA): At age 50, ask your care team if you should have this scan for osteoporosis (brittle bones).  Hepatitis C: Get tested at least once in your life.  STIs  (sexually transmitted infections)  Before age 24: Ask your care team if you should be screened for STIs.  After age 24: Get screened for STIs if you're at risk. You are at risk for STIs (including HIV) if:  You are sexually active with more than one person.  You don't use condoms every time.  You or a partner was diagnosed with a sexually transmitted infection.  If you are at risk for HIV, ask about PrEP medicine to prevent HIV.  Get tested for HIV at least once in your life, whether you are at risk for HIV or not.  Cancer screening tests  Cervical cancer screening: If you have a cervix, begin getting regular cervical cancer screening tests starting at age 21.  Breast cancer scan (mammogram): If you've ever had breasts, begin having regular mammograms starting at age 40. This is a scan to check for breast cancer.  Colon cancer screening: It is important to start screening for colon cancer at age 45.  Have a colonoscopy test every 10 years (or more often if you're at risk) Or, ask your provider about stool tests like a FIT test every year or Cologuard test every 3 years.  To learn more about your testing options, visit:   .  For help making a decision, visit:   https://bit.ly/xg13831.  Prostate cancer screening test: If you have a prostate, ask your care team if a prostate cancer screening test (PSA) at age 55 is right for you.  Lung cancer screening: If you are a current or former smoker ages 50 to 80, ask your care team if ongoing lung cancer screenings are right for you.  For informational purposes only. Not to replace the advice of your health care provider. Copyright   2023 [facility]. All rights reserved. Clinically reviewed by the [facility]. [employer] 617909 - REV 01/24.  Preventing Falls: Care Instructions  Injuries and health problems such as trouble walking or poor eyesight can increase your risk of falling. So can some medicines. But there are things you can do to  "help prevent falls. You can exercise to get stronger. You can also arrange your home to make it safer.    Talk to your doctor about the medicines you take. Ask if any of them increase the risk of falls and whether they can be changed or stopped.   Try to exercise regularly. It can help improve your strength and balance. This can help lower your risk of falling.     Practice fall safety and prevention.    Wear low-heeled shoes that fit well and give your feet good support. Talk to your doctor if you have foot problems that make this hard.  Carry a cellphone or wear a medical alert device that you can use to call for help.  Use stepladders instead of chairs to reach high objects. Don't climb if you're at risk for falls. Ask for help, if needed.  Wear the correct eyeglasses, if you need them.    Make your home safer.    Remove rugs, cords, clutter, and furniture from walkways.  Keep your house well lit. Use night-lights in hallways and bathrooms.  Install and use sturdy handrails on stairways.  Wear nonskid footwear, even inside. Don't walk barefoot or in socks without shoes.    Be safe outside.    Use handrails, curb cuts, and ramps whenever possible.  Keep your hands free by using a shoulder bag or backpack.  Try to walk in well-lit areas. Watch out for uneven ground, changes in pavement, and debris.  Be careful in the winter. Walk on the grass or gravel when sidewalks are slippery. Use de-icer on steps and walkways. Add non-slip devices to shoes.    Put grab bars and nonskid mats in your shower or tub and near the toilet. Try to use a shower chair or bath bench when bathing.   Get into a tub or shower by putting in your weaker leg first. Get out with your strong side first. Have a phone or medical alert device in the bathroom with you.   Where can you learn more?  Go to https://www.[employer].net/patiented  Enter G117 in the search box to learn more about \"Preventing Falls: Care Instructions.\"  Current as of: July " 17, 2023               Content Version: 14.0    7142-5725 [employer].   Care instructions adapted under license by your healthcare professional. If you have questions about a medical condition or this instruction, always ask your healthcare professional. [employer] disclaims any warranty or liability for your use of this information.

## 2024-08-01 NOTE — PROGRESS NOTES
"Preventive Care Visit  [facility]  Aditi WayTAMRA CNP, Family Medicine  Aug 1, 2024      Assessment & Plan     Routine general medical examination at a health care facility  Reviewed health maintenance/screening services guidelines/recommendations as well as vaccination recommendations as indicated which Katie understands. Services ordered after shared decision making agreed upon. Recommended healthy habits/diet and exercise.     Hyperlipidemia LDL goal <160  Chronic recurrent medical condition due for monitoring.  Patient would like to stop if levels are reasonable off medication. She is agreeable to restart if elevated.  - Lipid panel reflex to direct LDL Fasting  - Comprehensive metabolic panel (BMP + Alb, Alk Phos, ALT, AST, Total. Bili, TP)      Essential hypertension  Chronic stable medical condition; continue present management on current treatment regimen of lisinopril 10 mg daily. Spot check BP at home. Refilled.  - Comprehensive metabolic panel (BMP + Alb, Alk Phos, ALT, AST, Total. Bili, TP)  - UA Macroscopic with reflex to Microscopic and Culture - Lab Collect    Mild episode of recurrent major depressive disorder (H24)  Chronic medical condition. Due to situational events with mother in hospice, patient is agreeable to a [facility] referral; referral placed.  - Adult Mental Health  Referral    Grief  - Adult Mental Health  Referral    Encounter for screening mammogram for breast cancer  - MA Screen Bilateral w/Carlos      Patient has been advised of split billing requirements and indicates understanding: Yes      BMI  Estimated body mass index is 33.08 kg/m  as calculated from the following:    Height as of this encounter: 1.631 m (5' 4.21\").    Weight as of this encounter: 88 kg (194 lb).   Weight management plan: Discussed healthy diet and exercise guidelines    Counseling  Appropriate preventive services were addressed with this patient via screening, " questionnaire, or discussion as appropriate for fall prevention, nutrition, physical activity, Tobacco-use cessation, weight loss and cognition.  Checklist reviewing preventive services available has been given to the patient.  Reviewed patient's diet, addressing concerns and/or questions.   She is at risk for lack of exercise and has been provided with information to increase physical activity for the benefit of her well-being.   The patient was instructed to see the dentist every 6 months.       MEDICATIONS:  Continue current medications without change  SELF MONITORING:       - Please check blood pressure readings occasionally  Work on weight loss  Regular exercise    Diana Wilson is a 64 year old, presenting for the following:  Physical        8/1/2024    10:34 AM   Additional Questions   Roomed by Trudy RODRIGUEZ CMA        Health Care Directive  Patient does not have a Health Care Directive or Living Will: Discussed advance care planning with patient; however, patient declined at this time.    HPI    Patient declines pap smear and breast exam today.    Hyperlipidemia Follow-Up    Are you regularly taking any medication or supplement to lower your cholesterol?   No  Are you having muscle aches or other side effects that you think could be caused by your cholesterol lowering medication?  No  Patient would like know if she can stop taking the simvastatin. She has been out of medication and has not taken for the past 5-6 weeks.    Hypertension Follow-up    Do you check your blood pressure regularly outside of the clinic? No   Patient not checking blood pressure at home on a regular basis but is taking her medication regularly.     Depression and Anxiety   How are you doing with your depression since your last visit? Worsened, patient's mother is in hospice.   How are you doing with your anxiety since your last visit?  No change  Are you having other symptoms that might be associated with depression or anxiety? Yes:   little energy and trouble with sleep  Have you had a significant life event? Grief or Loss       Social History     Tobacco Use    Smoking status: Never    Smokeless tobacco: Never   Vaping Use    Vaping status: Never Used   Substance Use Topics    Alcohol use: No    Drug use: No         7/5/2019     1:00 PM 12/28/2023    10:03 AM 8/1/2024    11:31 AM   PHQ   PHQ-9 Total Score 11 3 3   Q9: Thoughts of better off dead/self-harm past 2 weeks Not at all Not at all Not at all         11/15/2017    11:43 AM 12/28/2023    12:15 PM   GUERA-7 SCORE   Total Score  0 (minimal anxiety)   Total Score 11 0       Suicide Assessment Five-step Evaluation and Treatment (SAFE-T)        7/29/2024   General Health   How would you rate your overall physical health? Good            7/29/2024   Nutrition   Three or more servings of calcium each day? Yes   Diet: Regular (no restrictions)   How many servings of fruit and vegetables per day? (!) 2-3   How many sweetened beverages each day? 0-1            7/29/2024   Exercise   Days per week of moderate/strenous exercise 3 days   Average minutes spent exercising at this level 70 min            7/29/2024   Social Factors   Frequency of gathering with friends or relatives Once a week   Worry food won't last until get money to buy more No   Food not last or not have enough money for food? No   Do you have housing? (Housing is defined as stable permanent housing and does not include staying ouside in a car, in a tent, in an abandoned building, in an overnight shelter, or couch-surfing.) No   Are you worried about losing your housing? No   Lack of transportation? No   Unable to get utilities (heat,electricity)? No   Want help with housing or utility concern? No      (!) HOUSING CONCERN PRESENT      8/1/2024   Fall Risk   Gait Speed Test (Document in seconds) 4.68   Gait Speed Test Interpretation Less than or equal to 5.00 seconds - PASS             7/29/2024   Dental   Dentist two times every year?  (!) NO            7/29/2024   TB Screening   Were you born outside of the [place]? No            Today's PHQ-9 Score:       8/1/2024    11:31 AM   PHQ-9 SCORE   PHQ-9 Total Score 3           7/29/2024   Substance Use   Alcohol more than 3/day or more than 7/wk No   Do you use any other substances recreationally? No        Social History     Tobacco Use    Smoking status: Never    Smokeless tobacco: Never   Vaping Use    Vaping status: Never Used   Substance Use Topics    Alcohol use: No    Drug use: No           9/20/2022   LAST FHS-7 RESULTS   1st degree relative breast or ovarian cancer No   Any relative bilateral breast cancer No   Any male have breast cancer No   Any ONE woman have BOTH breast AND ovarian cancer No   Any woman with breast cancer before 50yrs No   2 or more relatives with breast AND/OR ovarian cancer No   2 or more relatives with breast AND/OR bowel cancer No           Mammogram Screening - Mammogram every 1-2 years updated in Health Maintenance based on mutual decision making          7/29/2024   One time HIV Screening   Previous HIV test? Yes          7/29/2024   STI Screening   New sexual partner(s) since last STI/HIV test? No        History of abnormal Pap smear: No        9/3/2015    12:00 AM 1/17/2012    12:00 AM 9/22/2010    12:00 AM   PAP / HPV   PAP (Historical) NIL  NIL  OTHER-NIL EM>40      ASCVD Risk   The 10-year ASCVD risk score (Yudi KIM, et al., 2019) is: 6.1%    Values used to calculate the score:      Age: 64 years      Sex: Female      Is Non- : No      Diabetic: No      Tobacco smoker: No      Systolic Blood Pressure: 127 mmHg      Is BP treated: Yes      HDL Cholesterol: 67 mg/dL      Total Cholesterol: 207 mg/dL         Reviewed and updated as needed this visit by Provider   Tobacco  Allergies  Meds  Problems  Med Hx  Surg Hx  Fam Hx            Past Medical History:   Diagnosis Date    Arthritis     Depressive disorder     Hyperlipidemia   "   Hypertension 2018    Mild major depression (H24)      Past Surgical History:   Procedure Laterality Date    COLONOSCOPY      NO HISTORY OF SURGERY            Objective    Exam  /82 (BP Location: Right arm, Patient Position: Sitting, Cuff Size: Adult Large)   Pulse 68   Temp 97.8  F (36.6  C) (Oral)   Resp 22   Ht 1.631 m (5' 4.21\")   Wt 88 kg (194 lb)   SpO2 99%   BMI 33.08 kg/m     Estimated body mass index is 33.08 kg/m  as calculated from the following:    Height as of this encounter: 1.631 m (5' 4.21\").    Weight as of this encounter: 88 kg (194 lb).    Physical Exam  GENERAL: alert and no distress  EYES: Eyes grossly normal to inspection, PERRL and conjunctivae and sclerae normal  HENT: ear canals and TM's normal, nose and mouth without ulcers or lesions  NECK: no adenopathy, no asymmetry, masses, or scars  RESP: lungs clear to auscultation - no rales, rhonchi or wheezes  CV: regular rate and rhythm, normal S1 S2, no S3 or S4, no murmur, click or rub, no peripheral edema  ABDOMEN: soft, nontender, no hepatosplenomegaly, no masses and bowel sounds normal  MS: no gross musculoskeletal defects noted, no edema  SKIN: no suspicious lesions or rashes  NEURO: Normal strength and tone, mentation intact and speech normal  PSYCH: mentation appears normal, affect normal/bright        Signed Electronically by: TAMRA Malone CNP    "

## 2024-08-02 ENCOUNTER — MYC REFILL (OUTPATIENT)
Dept: FAMILY MEDICINE | Facility: CLINIC | Age: 64
End: 2024-08-02
Payer: COMMERCIAL

## 2024-08-02 DIAGNOSIS — E78.5 HYPERLIPIDEMIA LDL GOAL <160: ICD-10-CM

## 2024-08-02 DIAGNOSIS — I10 ESSENTIAL HYPERTENSION: ICD-10-CM

## 2024-08-02 LAB
ALBUMIN SERPL BCG-MCNC: 4.2 G/DL (ref 3.5–5.2)
ALP SERPL-CCNC: 79 U/L (ref 40–150)
ALT SERPL W P-5'-P-CCNC: 14 U/L (ref 0–50)
ANION GAP SERPL CALCULATED.3IONS-SCNC: 8 MMOL/L (ref 7–15)
AST SERPL W P-5'-P-CCNC: 13 U/L (ref 0–45)
BILIRUB SERPL-MCNC: 0.3 MG/DL
BUN SERPL-MCNC: 11.9 MG/DL (ref 8–23)
CALCIUM SERPL-MCNC: 8.8 MG/DL (ref 8.8–10.4)
CHLORIDE SERPL-SCNC: 101 MMOL/L (ref 98–107)
CHOLEST SERPL-MCNC: 270 MG/DL
CREAT SERPL-MCNC: 0.77 MG/DL (ref 0.51–0.95)
EGFRCR SERPLBLD CKD-EPI 2021: 86 ML/MIN/1.73M2
FASTING STATUS PATIENT QL REPORTED: YES
FASTING STATUS PATIENT QL REPORTED: YES
GLUCOSE SERPL-MCNC: 87 MG/DL (ref 70–99)
HCO3 SERPL-SCNC: 28 MMOL/L (ref 22–29)
HDLC SERPL-MCNC: 64 MG/DL
LDLC SERPL CALC-MCNC: 182 MG/DL
NONHDLC SERPL-MCNC: 206 MG/DL
POTASSIUM SERPL-SCNC: 4.8 MMOL/L (ref 3.4–5.3)
PROT SERPL-MCNC: 6.7 G/DL (ref 6.4–8.3)
SODIUM SERPL-SCNC: 137 MMOL/L (ref 135–145)
TRIGL SERPL-MCNC: 120 MG/DL

## 2024-08-02 RX ORDER — LISINOPRIL 10 MG/1
10 TABLET ORAL DAILY
Qty: 90 TABLET | Refills: 3 | OUTPATIENT
Start: 2024-08-02

## 2024-08-02 RX ORDER — SIMVASTATIN 10 MG
10 TABLET ORAL AT BEDTIME
Qty: 90 TABLET | Refills: 3 | Status: SHIPPED | OUTPATIENT
Start: 2024-08-02

## 2024-08-21 ENCOUNTER — PATIENT OUTREACH (OUTPATIENT)
Dept: CARE COORDINATION | Facility: CLINIC | Age: 64
End: 2024-08-21
Payer: COMMERCIAL

## 2024-09-18 ENCOUNTER — PATIENT OUTREACH (OUTPATIENT)
Dept: CARE COORDINATION | Facility: CLINIC | Age: 64
End: 2024-09-18
Payer: COMMERCIAL

## 2024-10-10 ENCOUNTER — TELEPHONE (OUTPATIENT)
Dept: FAMILY MEDICINE | Facility: CLINIC | Age: 64
End: 2024-10-10
Payer: COMMERCIAL

## 2024-10-10 NOTE — TELEPHONE ENCOUNTER
Patient Quality Outreach    Patient is due for the following:   Breast Cancer Screening - Mammogram    Next Steps:   Contacted patient to schedule their Mammogram    Type of outreach:    Sent [employer] message.      Questions for provider review:    None           Tereza Roberts CMA

## 2024-12-09 ENCOUNTER — TELEPHONE (OUTPATIENT)
Dept: FAMILY MEDICINE | Facility: CLINIC | Age: 64
End: 2024-12-09
Payer: COMMERCIAL

## 2024-12-09 NOTE — TELEPHONE ENCOUNTER
Patient Quality Outreach    Patient is due for the following:   Breast Cancer Screening - Mammogram    Action(s) Taken:   Reminded patient to schedule their Mammogram    Type of outreach:    Sent [employer] message.    Questions for provider review:    None           Tereza Roberts CMA

## 2024-12-21 ENCOUNTER — HEALTH MAINTENANCE LETTER (OUTPATIENT)
Age: 64
End: 2024-12-21

## 2024-12-26 DIAGNOSIS — G47.00 INSOMNIA, UNSPECIFIED TYPE: ICD-10-CM

## 2024-12-26 RX ORDER — HYDROXYZINE HYDROCHLORIDE 25 MG/1
25 TABLET, FILM COATED ORAL AT BEDTIME
Qty: 90 TABLET | Refills: 0 | Status: SHIPPED | OUTPATIENT
Start: 2024-12-26

## 2025-02-25 ENCOUNTER — TELEPHONE (OUTPATIENT)
Dept: FAMILY MEDICINE | Facility: CLINIC | Age: 65
End: 2025-02-25
Payer: COMMERCIAL

## 2025-02-25 NOTE — TELEPHONE ENCOUNTER
Patient Quality Outreach    Patient is due for the following:   Breast Cancer Screening - Mammogram    Action(s) Taken:   No follow up needed at this time.    Type of outreach:    Patient is currently being seen at a different clinic.     Questions for provider review:    None           Tereza Roberts CMA

## 2025-03-19 ENCOUNTER — PATIENT OUTREACH (OUTPATIENT)
Dept: CARE COORDINATION | Facility: CLINIC | Age: 65
End: 2025-03-19
Payer: COMMERCIAL

## 2025-03-29 DIAGNOSIS — G47.00 INSOMNIA, UNSPECIFIED TYPE: ICD-10-CM

## 2025-03-31 RX ORDER — HYDROXYZINE HYDROCHLORIDE 25 MG/1
25 TABLET, FILM COATED ORAL AT BEDTIME
Qty: 90 TABLET | Refills: 0 | Status: SHIPPED | OUTPATIENT
Start: 2025-03-31

## 2025-04-15 ENCOUNTER — TELEPHONE (OUTPATIENT)
Dept: FAMILY MEDICINE | Facility: CLINIC | Age: 65
End: 2025-04-15
Payer: COMMERCIAL

## 2025-04-15 NOTE — TELEPHONE ENCOUNTER
Patient Quality Outreach    Patient is due for the following:   Breast Cancer Screening - Mammogram    Action(s) Taken:   Patient is currently being seen at a different clinic.     Type of outreach:    Chart review performed, no outreach needed.    Questions for provider review:    None         Tereza Roberts CMA  Chart routed to None.

## 2025-07-02 ENCOUNTER — PATIENT OUTREACH (OUTPATIENT)
Dept: CARE COORDINATION | Facility: CLINIC | Age: 65
End: 2025-07-02
Payer: COMMERCIAL